# Patient Record
(demographics unavailable — no encounter records)

---

## 2024-10-15 NOTE — CONSULT LETTER
[Dear  ___] : Dear  [unfilled], [Consult Letter:] : I had the pleasure of evaluating your patient, [unfilled]. [Please see my note below.] : Please see my note below. [Consult Closing:] : Thank you very much for allowing me to participate in the care of this patient.  If you have any questions, please do not hesitate to contact me. [Sincerely,] : Sincerely, [FreeTextEntry2] : Dr. Janel Lomeli [FreeTextEntry3] : Mónica Fowler DO  Director of Pulmonary Hypertension Department of Pulmonary and Critical Care [facility]

## 2024-10-15 NOTE — PROCEDURE
[FreeTextEntry1] : 9/9/24 RHC Baseline /87/113, SpO2 89% on room air, SVC 57%, RA 65% PA 62% RA 10 RV 65/12 PA 69/34/41 PAWP 13 TP 28, DPG 21, PVR 9.3 LONG CO/CI (td) 3.0/1/9, SV 51, Svi 21 SVR 2746 dynes  PVR/SVR 0.27 Brennon 3.4, RVSWI 13.5  Val HR 51 /84/114, SpO2 100% PA 60/27/34 PAWP 15 CO/CI 73/1.75, SV 53, Svi 34 TPG 19, DPG 12  PVR 6.9  Severe precapillary disease, while she is at risk for WHO Group II and III PH, her hemodynamics are most consistent with  group I phenotype. Discussed with patient and  at bedside post procedure re: need for vasodilator therapy. Will plan for tadalafil + Opsumit for ease of polypharmacy, likely would benefit from full triple therapy + sotatercept, may be limited due to medication side effects, adherence and comorbidities. To follow up in clinic tomorrow to discuss further.  ***** 8/27/24 Pro   Na 146 (H) A1c 5.5% HIV, Hepatitis B and C non reactive Centromere Ab 2.7 (H) ESR, Rheumatoid factor, TSH, Scleroderma, ds DNA, Sjogren's SHERLY  WNL ***** 7/30/24 CT CHEST 1. Nonspecific bilateral pulmonary nodules measuring up to 1.3 cm in the right lower lobe. Recommend follow-up chest CT in 3 months to determine stability.  2. Severely dilated pulmonary artery representing pulmonary hypertension. Mosaic attenuation of the lungs likely secondary to pulmonary hypertension. **** 7/22/24 PFT FVC 1.65 (63) -> 1.85 (71) FEV1 0.77 (38) -> 0.78 (39) FEV1/FVC 59 -> 53 TLC 3.07 (62) DLCO 7.58 (41) There is moderate obstruction w/o sig BD response. Moderate restriction. No Air trapping. DLCO severely reduced *** 7/22/24 6MWT 183 meters Resting SpO2 92% on room air. Desaturation to 88% requiring supplemental O2 at 5L to maintain SpO2 > 88%. Mild Dyspnea (Mir 1) and fatigue (Mir 2.5). No rests taken. **** 7/3/24 ECHO 1. Left ventricular cavity is normal in size. Left ventricular systolic function is normal with an ejection fraction of 69 % by Jacobo's method of disks.  2. There is mild (grade 1) left ventricular diastolic dysfunction.  3. Mild left ventricular hypertrophy.  4. Normal right ventricular cavity size and normal right ventricular systolic function.  5. The left atrium is mildly dilated.  6. The right atrium is mildly dilated.  7. Aortic root at the sinuses of Valsalva is dilated, measuring 4.20 cm (indexed 2.74 cm/m). Ascending aorta diameter is dilated, measuring 4.50 cm (indexed 2.93 cm/m).  8. Mild aortic regurgitation.  9. Mild to moderate pulmonic regurgitation.  10. Dilated pulmonary artery. Main PA measures 4.0 cm. 1 1. Mild mitral regurgitation.  12. Mild tricuspid regurgitation.  13. Estimated pulmonary artery systolic pressure is 66 mmHg, consistent with severe pulmonary hypertension.  14. No pericardial effusion seen. 15. Compared to the transthoracic echocardiogram performed on 8/7/2018, ascending aorta is dilated, severe PHTN is present.

## 2024-10-15 NOTE — PHYSICAL EXAM
[No Acute Distress] : no acute distress [Normal Oropharynx] : normal oropharynx [Normal Appearance] : normal appearance [No Neck Mass] : no neck mass [Normal Rate/Rhythm] : normal rate/rhythm [Normal S1, S2] : normal s1, s2 [No Murmurs] : no murmurs [No Resp Distress] : no resp distress [Clear to Auscultation Bilaterally] : clear to auscultation bilaterally [No Abnormalities] : no abnormalities [Benign] : benign [Normal Gait] : normal gait [No Cyanosis] : no cyanosis [No Edema] : no edema [FROM] : FROM [Normal Color/ Pigmentation] : normal color/ pigmentation [No Focal Deficits] : no focal deficits [Oriented x3] : oriented x3 [Normal Affect] : normal affect [TextBox_99] : no articular manifestations of rheumatological disease [TextBox_105] : mild clubbing

## 2024-10-15 NOTE — REASON FOR VISIT
[Home] : at home, [unfilled] , at the time of the visit. [Medical Office: ([facility])___] : at the medical office located in  [Spouse] : spouse [Verbal consent obtained from patient] : the patient, [unfilled] [Follow-Up] : a follow-up visit [Pulmonary Hypertension] : pulmonary hypertension [Shortness of Breath] : shortness of breath [TextBox_13] : Dr. Janel Lomeli

## 2024-10-15 NOTE — REASON FOR VISIT
[Follow-Up] : a follow-up visit [Pulmonary Hypertension] : pulmonary hypertension [Shortness of Breath] : shortness of breath [TextBox_13] : Dr. Janel Lomeli

## 2024-10-15 NOTE — HISTORY OF PRESENT ILLNESS
[TextBox_4] : 73 year old F with asthma (Flovent Diskus) on cognitive disability and hearing loss here for evaluation pulmonary hypertension after seeing Dr. Janel Lomeli for cyanosis and shortness of breath. She is accompanied by her  who provides most of the history.  In 2022 she had COVID pneumonia and was hospitalized. She did not require invasive ventilation and was monitored for a few days. Since then she has had significant neurologic decline most notable for short term memory loss. She also has a longstanding history of asthma since adulthood. She does not recall how she was diagnosed or if she's ever had PFTs. She has been on Breo in the past and currently on wixela 250-50 once daily. In 4/2024 she was hospitalized at [facility] for pneumonia and discharged home on O2. Since discharge she has felt more fatigued, and with increased dyspnea on minimal exertion. She also notes that this was ongoing prior to her hospitalization but the pneumonia worsened it. Her  notes that her dyspnea is sporadic as sometimes she is able to go to [employer] and can walk for a mile without shortness of breath. She has a history of PDA requiring surgical closure and had CHF related to pregnancy many years ago. At her last visit with Dr. Loemli she noted she feels slow improvement in her breathing. She had an incident where she was at a family function and felt acutely dizzy/lightheaded. She increased her O2 from 2LPM to 4LPM with improvement. She is still having intermittent episodes of cyanotic hands.  On 2LPM O2 only with O2 saturations are low throughout the day, uses 4LPM with exertion. Essentially puts on O2 whenever cyanotic, but difficult to obtain SpO2. Uses albuterol and flovent diskus, and asthma is well controlled. Can walk 100 feet before feeling short of breath. Denies any fever, chills, cough, chest pain, weight loss, or leg swelling. Had bronchitis in December and PNA in March. Denies any Raynaud's phenomenon like symptoms.  PH Regimen tadalafil 40 mg daily [started October 2024] macitentan (Opsumit) 10 mg daily [started October 2024]  10-17-24 Started tadalafil 20 mg daily and then a few days later added Opsumit then several days later added the second 20 mg tablet of tadalafil. She had some episodes of dizziness after starting the full 40 mg tadalafil. She was instructed to decrease the tadalafil to 20 mg daily and to keep a log of her weights. Baseline weight 117 pounds, has increased to 121 pounds.  The circulation in her hands has improved since starting the medication

## 2024-10-15 NOTE — ASSESSMENT
[FreeTextEntry1] : 73 year old F with asthma (Flovent Diskus) here for evaluation pulmonary hypertension after seeing Dr. Janel Lomeli for cyanosis and shortness of breath. She is accompanied by her  who provides most of the history. Has signfiicant RIVAS and noted to have PH on TTE and severely enlarged PA.   At risk for multifactorial PH: WHO group I (congenital s/p PDA closure, needs to be assessed for CTD given abnormal parenchyma and mildly dilated esophagus on CT) vs WHO group II (diastolic dysfunction, enlarged LA) vs WHO Group III (combined restriction/obstructive pattern, CT chest with faint centrilobular GGO and mosaicism).  Non con CT showing possible mosaicism vs centrilobular GGO, dilated PA 5.4cm, and dilated esophagus with debris. 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   CT chest with contrast previously performed at OSH 4/2024. PFTs in July 2024 showed FEV1/FVC 59%, FEV1 39%, FVC 63%, TLC 62%, DLCO 41% (FVC/DCLO 1.5). 6MWT 183m. Combined obstruction and restriction w/ mod to severely reduced DLCO severely reduced walk distance.  REVEAL RISK: INT (if considered idiopathic) or HIGH (if found to have CTD)  RHC done 9/9/24 - severe precapillary disease with low CO/CI. Shunt run suggestive of bidirectional shunt, possible opening of PFO but this is likely protective due to severe PH. Her significant cognitive decline and difficulty with medications as well as overall goals of care make more aggressive treatment more difficult. Discussed extensively with patient, , and daughter - opt for dual oral therapy with tadalafil and opsumit (once daily dosing). Can consider escalation or alternative therapy if inadequate response or adverse medication side effects.   Plan: -Tadalafil + opsumit to start - Short interval reassessment, consider sotatercept and/or oral prostacyclin. Prostacyclin may be limited by side effects - Rheum evaluation to assess for scleroderma

## 2024-10-15 NOTE — HISTORY OF PRESENT ILLNESS
[TextBox_4] : 73 year old F with asthma (Flovent Diskus) on cognitive disability and hearing loss here for evaluation pulmonary hypertension after seeing Dr. Janel Lomeli for cyanosis and shortness of breath. She is accompanied by her  who provides most of the history.  In 2022 she had COVID pneumonia and was hospitalized. She did not require invasive ventilation and was monitored for a few days. Since then she has had significant neurologic decline most notable for short term memory loss. She also has a longstanding history of asthma since adulthood. She does not recall how she was diagnosed or if she's ever had PFTs. She has been on Breo in the past and currently on wixela 250-50 once daily. In 4/2024 she was hospitalized at [facility] for pneumonia and discharged home on O2. Since discharge she has felt more fatigued, and with increased dyspnea on minimal exertion. She also notes that this was ongoing prior to her hospitalization but the pneumonia worsened it. Her  notes that her dyspnea is sporadic as sometimes she is able to go to [employer] and can walk for a mile without shortness of breath. She has a history of PDA requiring surgical closure and had CHF related to pregnancy many years ago. At her last visit with Dr. Lomeli she noted she feels slow improvement in her breathing. She had an incident where she was at a family function and felt acutely dizzy/lightheaded. She increased her O2 from 2LPM to 4LPM with improvement. She is still having intermittent episodes of cyanotic hands.  On 2LPM O2 only with O2 saturations are low throughout the day, uses 4LPM with exertion. Essentially puts on O2 whenever cyanotic, but difficult to obtain SpO2. Uses albuterol and flovent diskus, and asthma is well controlled. Can walk 100 feet before feeling short of breath. Denies any fever, chills, cough, chest pain, weight loss, or leg swelling. Had bronchitis in December and PNA in March. Denies any Raynaud's phenomenon like symptoms.  PH Regimen tadalafil 40 mg daily [started October 2024] macitentan (Opsumit) 10 mg [started October 2024]  11-12-24   9-10-24 Here to review RHC results and start on vasodilators. Cath yesterday with severe precap PH and low CO/CI.

## 2024-10-16 NOTE — HISTORY OF PRESENT ILLNESS
[TextBox_4] : 73 year old F with asthma (Flovent Diskus) on cognitive disability and hearing loss here for evaluation pulmonary hypertension after seeing Dr. Janel Lomeli for cyanosis and shortness of breath. She is accompanied by her  who provides most of the history.  In 2022 she had COVID pneumonia and was hospitalized. She did not require invasive ventilation and was monitored for a few days. Since then she has had significant neurologic decline most notable for short term memory loss. She also has a longstanding history of asthma since adulthood. She does not recall how she was diagnosed or if she's ever had PFTs. She has been on Breo in the past and currently on wixela 250-50 once daily. In 4/2024 she was hospitalized at [facility] for pneumonia and discharged home on O2. Since discharge she has felt more fatigued, and with increased dyspnea on minimal exertion. She also notes that this was ongoing prior to her hospitalization but the pneumonia worsened it. Her  notes that her dyspnea is sporadic as sometimes she is able to go to [employer] and can walk for a mile without shortness of breath. She has a history of PDA requiring surgical closure and had CHF related to pregnancy many years ago. At her last visit with Dr. Lomeli she noted she feels slow improvement in her breathing. She had an incident where she was at a family function and felt acutely dizzy/lightheaded. She increased her O2 from 2LPM to 4LPM with improvement. She is still having intermittent episodes of cyanotic hands.  On 2LPM O2 only with O2 saturations are low throughout the day, uses 4LPM with exertion. Essentially puts on O2 whenever cyanotic, but difficult to obtain SpO2. Uses albuterol and flovent diskus, and asthma is well controlled. Can walk 100 feet before feeling short of breath. Denies any fever, chills, cough, chest pain, weight loss, or leg swelling. Had bronchitis in December and PNA in March. Denies any Raynaud's phenomenon like symptoms.  PH Regimen tadalafil 40 mg daily [started October 2024] macitentan (Opsumit) 10 mg daily [started October 2024]  10-16-24 Started tadalafil 20 mg daily and then a few days later added Opsumit then several days later added the second 20 mg tablet of tadalafil. She had some episodes of dizziness after starting the full 40 mg tadalafil. She was instructed to decrease the tadalafil to 20 mg daily and to keep a log of her weights. Baseline weight 117 pounds, has increased to 122 pounds. There are some noticeable physical changes. For instance, Edita has always needed a spacer for her wedding ring to fit comfortably. However, the spacer has now been removed as the ring fits snugly without it. Additionally, there are signs of slight puffiness in her hands. Also, this past Monday Edita had a routine follow-up with Dr. Torres and he looked at Edita's knees and ankles as part of her check up.  He wasn't alarmed with any of his observations, but did suggest providing us with her current weight gain update.  The circulation in her hands has improved since starting the medication

## 2024-10-16 NOTE — ASSESSMENT
[FreeTextEntry1] : 73 year old F with asthma (Flovent Diskus) here for evaluation pulmonary hypertension after seeing Dr. Janel Lomeli for cyanosis and shortness of breath. She is accompanied by her  who provides most of the history. Has signfiicant RIVAS and noted to have PH on TTE and severely enlarged PA.   At risk for multifactorial PH: WHO group I (congenital s/p PDA closure, needs to be assessed for CTD given abnormal parenchyma and mildly dilated esophagus on CT) vs WHO group II (diastolic dysfunction, enlarged LA) vs WHO Group III (combined restriction/obstructive pattern, CT chest with faint centrilobular GGO and mosaicism).  Non con CT showing possible mosaicism vs centrilobular GGO, dilated PA 5.4cm, and dilated esophagus with debris. 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  CT chest with contrast previously performed at OSH 4/2024. PFTs in July 2024 showed FEV1/FVC 59%, FEV1 39%, FVC 63%, TLC 62%, DLCO 41% (FVC/DCLO 1.5). 6MWT 183m. Combined obstruction and restriction w/ mod to severely reduced DLCO severely reduced walk distance.  REVEAL RISK: INT (if considered idiopathic)   RHC done 9/9/24 - severe precapillary disease with low CO/CI. Shunt run suggestive of bidirectional shunt, possible opening of PFO but this is likely protective due to severe PH. Her significant cognitive decline and difficulty with medications as well as overall goals of care make more aggressive treatment more difficult. Dual oral therapy with tadalafil and Opsumit (once daily dosing) started last week. Since beginning therapy she has had some dizziness and a 5 pound dry weight gain.  Plan: - Tadalafil 20 mg daily (decreased from 40 mg after c/o dizziness) - Opsumit 10 mg daily - BMP and NTpro BNP today and start furosemide 20 mg daily to offset fluid retention from ERA and PDE5i - Short interval reassessment, consider sotatercept and/or oral prostacyclin. Prostacyclin may be limited by side effects  RTC in [place] 11/12/24

## 2024-10-24 NOTE — HISTORY OF PRESENT ILLNESS
[TextBox_4] : 73 year old F with asthma (Flovent Diskus) on cognitive disability and hearing loss here for evaluation pulmonary hypertension after seeing Dr. Janel Lomeli for cyanosis and shortness of breath. She is accompanied by her  who provides most of the history.  In 2022 she had COVID pneumonia and was hospitalized. She did not require invasive ventilation and was monitored for a few days. Since then she has had significant neurologic decline most notable for short term memory loss. She also has a longstanding history of asthma since adulthood. She does not recall how she was diagnosed or if she's ever had PFTs. She has been on Breo in the past and currently on wixela 250-50 once daily. In 4/2024 she was hospitalized at [facility] for pneumonia and discharged home on O2. Since discharge she has felt more fatigued, and with increased dyspnea on minimal exertion. She also notes that this was ongoing prior to her hospitalization but the pneumonia worsened it. Her  notes that her dyspnea is sporadic as sometimes she is able to go to [employer] and can walk for a mile without shortness of breath. She has a history of PDA requiring surgical closure and had CHF related to pregnancy many years ago. At her last visit with Dr. Lomeli she noted she feels slow improvement in her breathing. She had an incident where she was at a family function and felt acutely dizzy/lightheaded. She increased her O2 from 2LPM to 4LPM with improvement. She is still having intermittent episodes of cyanotic hands.  On 2LPM O2 only with O2 saturations are low throughout the day, uses 4LPM with exertion. Essentially puts on O2 whenever cyanotic, but difficult to obtain SpO2. Uses albuterol and flovent diskus, and asthma is well controlled. Can walk 100 feet before feeling short of breath. Denies any fever, chills, cough, chest pain, weight loss, or leg swelling. Had bronchitis in December and PNA in March. Denies any Raynaud's phenomenon like symptoms.  PH Regimen tadalafil 40 mg daily [started October 2024] macitentan (Opsumit) 10 mg daily [started October 2024]  10-24-24 She was feeling a little dizzy this morning. She was doing well the past few days. Got up too fast this morning and felt lightheaded and dizzy.  forgot to take her BP while she was not feeling. She decreased the dose of tadalafil to 20 mg for 3 days then increased back to 40 mg. "purple hands" incidents are much less. She always feels cold but that is not new. Her SpO2 has been 96% on room air, goes as low as 91%. Now that she is on the medication it is much easier to get the SpO2 readings. Uses O2 at 2-3L/min at rest if she feels like she needs it. When they are out of the home and she feels out of breath they give her 3-4L. Her POC is very heavy.  She has been taking furosemide 20 mg daily. Weight has been 121 - 122.7.Her hands are not puffy any longer. Her rings are fitting better.   has seen her walking more since starting the medication but when she is tired she is VERY fatigued.

## 2024-10-24 NOTE — REASON FOR VISIT
[Home] : at home, [unfilled] , at the time of the visit. [Medical Office: ([facility])___] : at the medical office located in  [Spouse] : spouse [Patient] : the patient [Self] : self [Follow-Up] : a follow-up visit [Pulmonary Hypertension] : pulmonary hypertension [Shortness of Breath] : shortness of breath [TextBox_13] : Dr. Janle Lomeli

## 2024-10-24 NOTE — ASSESSMENT
[FreeTextEntry1] : 73 year old F with asthma (Flovent Diskus) here for evaluation pulmonary hypertension after seeing Dr. Janel Lomeli for cyanosis and shortness of breath. She is accompanied by her  who provides most of the history. Has signfiicant RIVAS and noted to have PH on TTE and severely enlarged PA.   At risk for multifactorial PH: WHO group I (congenital s/p PDA closure, needs to be assessed for CTD given abnormal parenchyma and mildly dilated esophagus on CT) vs WHO group II (diastolic dysfunction, enlarged LA) vs WHO Group III (combined restriction/obstructive pattern, CT chest with faint centrilobular GGO and mosaicism).  Non con CT showing possible mosaicism vs centrilobular GGO, dilated PA 5.4cm, and dilated esophagus with debris. 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  CT chest with contrast previously performed at OSH 4/2024. PFTs in July 2024 showed FEV1/FVC 59%, FEV1 39%, FVC 63%, TLC 62%, DLCO 41% (FVC/DCLO 1.5). 6MWT 183m. Combined obstruction and restriction w/ mod to severely reduced DLCO severely reduced walk distance.  REVEAL RISK: INT   RHC done 9/9/24 - severe precapillary disease with low CO/CI. Shunt run suggestive of bidirectional shunt, possible opening of PFO but this is likely protective due to severe PH. Her significant cognitive decline and difficulty with medications as well as overall goals of care make more aggressive treatment more difficult. Dual oral therapy with tadalafil and Opsumit (once daily dosing) started about 3 weeks ago. Since beginning therapy she has had some dizziness and a 5 pound dry weight gain. Her peripheral edema has improved on furosemide and weight gain has stabilized. Her dizziness returned when the tadalafil was increased back to 40 mg.   Plan: - Tadalafil 20 mg daily (reinforced to keep dose at 20 mg until she sees us in November) - Opsumit 10 mg daily - Continue furosemide 20 mg daily to offset fluid retention from ERA and PDE5i - Monitor BP at home, especially when feeling dizzy. Keep a log of BPs - Use oxygen with activity. Pt is mobile inside and outside of the home and benefits from portable oxygen concentrator although her present POC is very heavy - Short interval reassessment, consider sotatercept and/or oral prostacyclin. Prostacyclin may be limited by side effects  RTC in [place] 11/12/24
I have personally seen and examined this patient.  I have fully participated in the care of this patient. I have reviewed all pertinent clinical information, including history, physical exam, plan and the Resident’s note and agree except as noted.

## 2024-11-12 NOTE — CONSULT LETTER
[FreeTextEntry2] : Dr. Janel Lomeli [FreeTextEntry3] : Mónica Fowler DO  Director of Pulmonary Hypertension Department of Pulmonary and Critical Care [facility]

## 2024-11-12 NOTE — HISTORY OF PRESENT ILLNESS
[TextBox_4] : 73 year old F with asthma (Flovent Diskus) on cognitive disability and hearing loss here for evaluation pulmonary hypertension after seeing Dr. Janel Lomeli for cyanosis and shortness of breath. She is accompanied by her  who provides most of the history. In 2022 she had COVID pneumonia and was hospitalized. She did not require invasive ventilation and was monitored for a few days. Since then she has had significant neurologic decline most notable for short term memory loss. She also has a longstanding history of asthma since adulthood. She does not recall how she was diagnosed or if she's ever had PFTs, on flovent. In 4/2024 she was hospitalized at [facility] for pneumonia and discharged home on O2. Since discharge she has felt more fatigued, and with increased dyspnea on minimal exertion. She also notes that this was ongoing prior to her hospitalization but the pneumonia worsened it. She has a history of PDA requiring surgical closure and had CHF related to pregnancy many years ago. At our initial visit she had cyanosis of hands making SpO2 difficult, noted SOB w/ 100 ft. Underwent RHC which demonstrated    PH Regimen tadalafil 40 mg daily [started October 2024] macitentan (Opsumit) 10 mg [started October 2024]  11-12-24  notes a change - the good days are "great", less SOB. Bad days seem heavier than even before. On the good days she can definitely do more, but the times when she is SOB it is more severe. Rescue inhaler immediately helps symptoms. Using it once a day. If they know they're going to go for a walk she takes the rescue inhaler first and she tolerates walk better. They have noticed some wheezing and audible breathing more often. The good days are more often than the bad days since starting PH therapy.  Having tooth aches, had emergency visit to dentist this past weekend. Needs two teeth extracted. Dentist was concerned about any ?contraindications? from opsumit

## 2024-11-12 NOTE — CONSULT LETTER
[FreeTextEntry2] : Dr. Janel Lomeli [FreeTextEntry3] : Mónica Fowler DO  Director of Pulmonary Hypertension Department of Pulmonary and Critical Care [facility]

## 2024-11-12 NOTE — ASSESSMENT
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 At risk for multifactorial PH: WHO group I (congenital s/p PDA closure) vs WHO group II (diastolic dysfunction, enlarged LA) vs WHO Group III (combined restriction/obstructive pattern, CT chest with faint centrilobular GGO and mosaicism, dilated PA 5.4cm, and dilated esophagus with debris). She does have anticentromere ab but rheum felt this was a false positive given lack of additional signs/sx  CT chest with contrast previously performed at OSH 4/2024. PFTs in July 2024 showed FEV1/FVC 59%, FEV1 39%, FVC 63%, TLC 62%, DLCO 41% (FVC/DCLO 1.5). 6MWT 183m. Combined obstruction and restriction w/ mod to severely reduced DLCO severely reduced walk distance. RHC done 9/9/24 - severe precapillary disease with low CO/CI. Shunt run suggestive of bidirectional shunt, possible opening of PFO but this is likely protective due to severe PH.   REVEAL RISK: INT (if considered idiopathic)   Her significant cognitive decline and difficulty with medications as well as overall goals of care make more aggressive treatment more difficult. Discussed extensively with patient, , and daughter - opt for dual oral therapy with tadalafil and opsumit (once daily dosing). Can consider escalation or alternative therapy if inadequate response or adverse medication side effects. Now on dual therapy x 1 month, seems overall to have some improvement but possible that vasodilators have exacerbated airway disease/occasional V/Q mismatching. Severe SOB episodes are transient and improve w/ rescue inhaler. Mild fluid retention on opsumit with inc weight of 5lbs.  Plan: -Tadalafil + opsumit, increase lasix to 40mg every other day until weight down to 117lbs - Step up inhaler therapy to advair daily, continue PRN albuterol, monitor need for rescue inhaler - Short interval reassessment at 3 months w/ PFT, 6MWT, NTproBNP to reassess risk asssement - Consider sotatercept and/or oral prostacyclin. Prostacyclin may be limited by side effects  F/U 2 months after above

## 2024-11-12 NOTE — HISTORY OF PRESENT ILLNESS
[TextBox_4] : 73 year old F with asthma (Flovent Diskus) on cognitive disability and hearing loss here for evaluation pulmonary hypertension after seeing Dr. Janel Lomeli for cyanosis and shortness of breath. She is accompanied by her  who provides most of the history. In 2022 she had COVID pneumonia and was hospitalized. She did not require invasive ventilation and was monitored for a few days. Since then she has had significant neurologic decline most notable for short term memory loss. She also has a longstanding history of asthma since adulthood. She does not recall how she was diagnosed or if she's ever had PFTs, on flovent. In 4/2024 she was hospitalized at [facility] for pneumonia and discharged home on O2. Since discharge she has felt more fatigued, and with increased dyspnea on minimal exertion. She also notes that this was ongoing prior to her hospitalization but the pneumonia worsened it. She has a history of PDA requiring surgical closure and had CHF related to pregnancy many years ago. At our initial visit she had cyanosis of hands making SpO2 difficult, noted SOB w/ 100 ft. Underwent RHC which demonstrated    PH Regimen tadalafil 40 mg daily [started October 2024] macitentan (Opsumit) 10 mg [started October 2024]  11-12-24  notes a change - the good days are "great", less SOB. Bad days seem heavier than even before. On the good days she can definitely do more, but the times when she is SOB it is more severe. Rescue inhaler immediately helps symptoms. Using it once a day. If they know they're going to go for a walk she takes the rescue inhaler first and she tolerates walk better. They have noticed some wheezing and audible breathing more often. The good days are more often than the bad days since starting PH therapy.  Having tooth aches, had emergency visit to dentist this past weekend. Needs two teeth extracted. Dentist was concerned about any ?contraindications? from opsumit

## 2024-12-10 NOTE — ASSESSMENT
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 At risk for multifactorial PH: WHO group I (congenital s/p PDA closure) vs WHO group II (diastolic dysfunction, enlarged LA) vs WHO Group III (combined restriction/obstructive pattern, CT chest with faint centrilobular GGO and mosaicism, dilated PA 5.4cm, and dilated esophagus with debris). She does have anticentromere ab but rheum felt this was a false positive given lack of additional signs/sx  CT chest with contrast previously performed at OSH 4/2024. PFTs in July 2024 showed FEV1/FVC 59%, FEV1 39%, FVC 63%, TLC 62%, DLCO 41% (FVC/DCLO 1.5). 6MWT 183m. Combined obstruction and restriction w/ mod to severely reduced DLCO severely reduced walk distance. RHC done 9/9/24 - severe precapillary disease with low CO/CI. Shunt run suggestive of bidirectional shunt, possible opening of PFO but this is likely protective due to severe PH.   REVEAL RISK: INT (if considered idiopathic)   Started on dual oral therapy due to concern of more complicated/titrated medications with her significant cognitive decline and difficulty with medications. Initially noted definite improvement although some episodic sudden RIVAS for which may have been more related to airway disease and stepped up advair therapy. Now her breathing symptoms are improved but she is having frequent dizzy spells. Etiology remains unclear. Need to r/o worsening PH (although recent NTproBNP WNL), r/o PE, d/w cardiology re: biopatch to r/o arrhythmias, and F/U w/ PCP to r/o non cardiopulmonary causes (carotid US? TSH?).   Plan: - Repeat NTproBNP, obtain CTA PE protocol, repeat TTE, and d/w cardiology re: HR monitor device to r/o arrhythma - Advised to go to ER if dizziness worsens or persists -Tadalafil + opsumit, increase lasix to 40mg every other day until weight down to 117lbs - Continue advair daily, continue PRN albuterol, monitor need for rescue inhaler - Consider sotatercept and/or oral prostacyclin. Prostacyclin may be limited by side effects  F/U 1 month

## 2024-12-10 NOTE — HISTORY OF PRESENT ILLNESS
[TextBox_4] : 73 year old F with asthma (Flovent Diskus) on cognitive disability and hearing loss here for evaluation pulmonary hypertension after seeing Dr. Janel Lomeli for cyanosis and shortness of breath. She is accompanied by her  who provides most of the history. In 2022 she had COVID pneumonia and was hospitalized. She did not require invasive ventilation and was monitored for a few days. Since then she has had significant neurologic decline most notable for short term memory loss. She also has a longstanding history of asthma since adulthood. She does not recall how she was diagnosed or if she's ever had PFTs, on flovent. In 4/2024 she was hospitalized at [facility] for pneumonia and discharged home on O2. Since discharge she has felt more fatigued, and with increased dyspnea on minimal exertion. She also notes that this was ongoing prior to her hospitalization but the pneumonia worsened it. She has a history of PDA requiring surgical closure and had CHF related to pregnancy many years ago. At our initial visit she had cyanosis of hands making SpO2 difficult, noted SOB w/ 100 ft. Underwent RHC which demonstrated severe precap PH (mPA 41, PAWP 13, CO/CI 3/1.9, PVR 9). Given severity of PVR it was felt this was vascular phenotype despite group II and III disease, she was placed on dual therapy w/ macitentan/tadalafil   PH Regimen tadalafil 20 mg daily [started October 2024] macitentan (Opsumit) 10 mg [started October 2024]  12-10-24 Email from : I wanted to provide an update on Edita. Over the past few weeks, she has been experiencing sudden frequent episodes of dizziness and being disoriented. These episodes seem relatively consistent and are often triggered by: 	-	Walking more than 50 feet. 	-	Morning and sometimes afternoon activities, especially during or right after using the restroom. While not entirely certain, taking oxygen seems to help her recover more quickly. Although it seems that she is not having unusual breathing difficulty.  Additionally, she appears noticeably more tired and exhausted overall. Do you have any suggestions on how we should proceed?  Thank you for your guidance, Yessenia   12/10/24 - Telephone visit to discuss above email. Difficult to decipher symptoms as pt is poor historian due to memory loss, difficulty answering questions, relies on her  for much of history which limits understanding of these brief dizziness spells. They come on suddenly and resolve quickly. He will usually put her on oxygen though he has not documented hypoxia with pulse ox. Cannot say whether he puts her on O2 first or checks her pulse ox first. He does not feel her SOB is worse, but the dizziness is more frequent.

## 2024-12-10 NOTE — REASON FOR VISIT
[Home] : at home, [unfilled] , at the time of the visit. [Medical Office: ([facility])___] : at the medical office located in  [Spouse] : spouse [Verbal consent obtained from patient] : the patient, [unfilled] [Follow-Up] : a follow-up visit [Pulmonary Hypertension] : pulmonary hypertension [Shortness of Breath] : shortness of breath [FreeTextEntry4] : Yessenia Nicolas [TextBox_13] : Dr. Janel Lomeli

## 2024-12-23 NOTE — PROCEDURE
[FreeTextEntry1] : 12/18/24 ECHO at [place] 1. Left ventricular cavity is normal in size. Left ventricular systolic function is normal with an ejection fraction of 77 % by Jacobo's method of disks. 2. There is mild (grade 1) left ventricular diastolic dysfunction. 3. There is increased LV mass and concentric hypertrophy. 4. Normal right ventricular cavity size and normal right ventricular systolic function. 5. Left atrium is severely dilated. 6. The right atrium is mildly dilated. 7. Aortic root at the sinuses of Valsalva is dilated, measuring 4.20 cm (indexed 2.70 cm/m). Ascending aorta is dilated, measuring 4.40 cm (indexed 2.83 cm/m). 8. The main pulmonary artery is dilated at 4.6 cm. There appears to be a PDA. 9. Mild aortic regurgitation. 10. Moderate pulmonic regurgitation. 11. Mild mitral regurgitation. 12. Pulmonary artery systolic pressure could not be estimated. 13. No pericardial effusion seen. 14. Compared to the transthoracic echocardiogram performed on 7/3/2024, main PA is more dilated, there appears to be a PDA. ***** 12/18/24 CTA CHEST at [place] 1. No pulmonary embolism. Dilated main pulmonary artery suggestive of pulmonary hypertension.    2. Scattered areas of small area and large airway disease. Numerous lung nodules, probably  infectious/inflammatory in nature. 13 mm nodule at the basilar right lower lobe. Recommend follow-up CT chest in 6-12 months to ensure stability.    3. Aneurysmal thoracic aorta measuring up to 4.4 cm at the ascending aorta. Continued surveillance  is recommended.    4. Partial opacification of the hepatic veins suggestive of right heart failure.    5. Outpouching at the left side of the upper esophagus, probably a [place] diverticulum.     ***** 9/9/24 RHC Baseline /87/113, SpO2 89% on room air, SVC 57%, RA 65% PA 62% RA 10 RV 65/12 PA 69/34/41 PAWP 13 TP 28, DPG 21, PVR 9.3 LONG CO/CI (td) 3.0/1/9, SV 51, Svi 21 SVR 2746 dynes  PVR/SVR 0.27 Brennon 3.4, RVSWI 13.5  Val HR 51 /84/114, SpO2 100% PA 60/27/34 PAWP 15 CO/CI 73/1.75, SV 53, Svi 34 TPG 19, DPG 12  PVR 6.9  Severe precapillary disease, while she is at risk for WHO Group II and III PH, her hemodynamics are most consistent with  group I phenotype. Discussed with patient and  at bedside post procedure re: need for vasodilator therapy. Will plan for tadalafil + Opsumit for ease of polypharmacy, likely would benefit from full triple therapy + sotatercept, may be limited due to medication side effects, adherence and comorbidities. To follow up in clinic tomorrow to discuss further.  ***** 8/27/24 Pro   Na 146 (H) A1c 5.5% HIV, Hepatitis B and C non reactive Centromere Ab 2.7 (H) ESR, Rheumatoid factor, TSH, Scleroderma, ds DNA, Sjogren's SHERLY  WNL ***** 7/30/24 CT CHEST 1. Nonspecific bilateral pulmonary nodules measuring up to 1.3 cm in the right lower lobe. Recommend follow-up chest CT in 3 months to determine stability.  2. Severely dilated pulmonary artery representing pulmonary hypertension. Mosaic attenuation of the lungs likely secondary to pulmonary hypertension. **** 7/22/24 PFT FVC 1.65 (63) -> 1.85 (71) FEV1 0.77 (38) -> 0.78 (39) FEV1/FVC 59 -> 53 TLC 3.07 (62) DLCO 7.58 (41) There is moderate obstruction w/o sig BD response. Moderate restriction. No Air trapping. DLCO severely reduced *** 7/22/24 6MWT 183 meters Resting SpO2 92% on room air. Desaturation to 88% requiring supplemental O2 at 5L to maintain SpO2 > 88%. Mild Dyspnea (Mir 1) and fatigue (Mir 2.5). No rests taken. **** 7/3/24 ECHO 1. Left ventricular cavity is normal in size. Left ventricular systolic function is normal with an ejection fraction of 69 % by Jacobo's method of disks.  2. There is mild (grade 1) left ventricular diastolic dysfunction.  3. Mild left ventricular hypertrophy.  4. Normal right ventricular cavity size and normal right ventricular systolic function.  5. The left atrium is mildly dilated.  6. The right atrium is mildly dilated.  7. Aortic root at the sinuses of Valsalva is dilated, measuring 4.20 cm (indexed 2.74 cm/m). Ascending aorta diameter is dilated, measuring 4.50 cm (indexed 2.93 cm/m).  8. Mild aortic regurgitation.  9. Mild to moderate pulmonic regurgitation.  10. Dilated pulmonary artery. Main PA measures 4.0 cm. 1 1. Mild mitral regurgitation.  12. Mild tricuspid regurgitation.  13. Estimated pulmonary artery systolic pressure is 66 mmHg, consistent with severe pulmonary hypertension.  14. No pericardial effusion seen. 15. Compared to the transthoracic echocardiogram performed on 8/7/2018, ascending aorta is dilated, severe PHTN is present.

## 2024-12-23 NOTE — ASSESSMENT
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 At risk for multifactorial PH: WHO group I (congenital s/p PDA closure) vs WHO group II (diastolic dysfunction, enlarged LA) vs WHO Group III (combined restriction/obstructive pattern, CT chest with faint centrilobular GGO and mosaicism, dilated PA 5.4cm, and dilated esophagus with debris). 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 PFTs with obstruction and restriction + severely reduced DLCO  (FEV1/FVC 59%, FEV1 39%, FVC 63%, TLC 62%, DLCO 41%, FVC/DCLO 1.5). 6MWT 183m severely reduced walk distance. RHC done 9/9/24 - severe precapillary disease with low CO/CI. Shunt run suggestive of bidirectional shunt, possible opening of PFO but this is likely protective due to severe PH (SVC to PA increase of 5%, Ao sat 89%)  REVEAL RISK: INT (if considered idiopathic)   Started on dual oral therapy due to concern of more complicated/titrated medications with her significant cognitive decline and difficulty with medications. Initially noted definite improvement although some episodic sudden RIVAS for which may have been more related to airway disease and stepped up advair therapy. Now her breathing symptoms are improved but she is having frequent dizzy spells. Holter monitor was placed on 12/16/24, she has had multiple episodes since then and now 2 ER visits. Given worsening dizziness without worsening RV size/function and only mild increase in NTproBNP, decision made to temporarily stop tadalafil. Her mutlifactorial PH with group I, II, and III RF may make her intolerant to dual oral therapy. PDE5i more likely to cause dizziness than ERA.    Ultimately may need further work up for PDA including cMRI and/or repeat RHC. Given she feels improved off tadalafil, we will continue with monotherapy until follow up visit in 3 weeks. Will also reach out to cardiology to discuss evaluating monitor early to r/o arrhythmias during these multiple events at home. Lastly, would need to consider LM coronary artery compression by dilated PA but this seems less likely. May need to consider chest pain was more related to esophageal disease and constipation noted on CT. Will also obtain home sleep study to r/o DEION.  If she was recurrence of her chest pain and/or progression of her dizziness, we have advised her  to bring to ED and plan for transfer to [facility] (or come straight to [facility] if feasible) for admission to further assess for LM compression, PDA shunting, and progression of PAH with RHC.   Plan: - HOLD tadalafil until next follow up - F/U holter monitor readings to r/o arrhythmia, sent message to Dr. Dionisio Lomeli - Home sleep study - Reassess in 3 weeks and will plan for either cMRI and/or repeat RHC to reassess shunt and PH, may need referral to Dr. García (congenital) - If she again has chest pain and/or worsening dizziness we have asked that she be transferred to [facility] for more in-depth work up as outlined above.  F/U 3 weeks

## 2024-12-23 NOTE — REASON FOR VISIT
[Home] : at home, [unfilled] , at the time of the visit. [Medical Office: ([facility])___] : at the medical office located in  [Spouse] : spouse [Verbal consent obtained from patient] : the patient, [unfilled] [Follow-Up] : a follow-up visit [Pulmonary Hypertension] : pulmonary hypertension [Shortness of Breath] : shortness of breath [FreeTextEntry4] : Yessenia Nicolas [TextBox_13] : Dr. Janel Lomeli

## 2024-12-23 NOTE — HISTORY OF PRESENT ILLNESS
[TextBox_4] : 73 year old F with asthma (Flovent Diskus) on cognitive disability and hearing loss here for evaluation pulmonary hypertension after seeing Dr. Janel Lomeli for cyanosis and shortness of breath. She is accompanied by her  who provides most of the history. In 2022 she had COVID pneumonia and was hospitalized. She did not require invasive ventilation and was monitored for a few days. Since then she has had significant neurologic decline most notable for short term memory loss. She also has a longstanding history of asthma since adulthood. She does not recall how she was diagnosed or if she's ever had PFTs, on flovent. In 4/2024 she was hospitalized at [facility] for pneumonia and discharged home on O2. Since discharge she has felt more fatigued, and with increased dyspnea on minimal exertion. She also notes that this was ongoing prior to her hospitalization but the pneumonia worsened it. She has a history of PDA requiring surgical closure and had CHF related to pregnancy many years ago. At our initial visit she had cyanosis of hands making SpO2 difficult, noted SOB w/ 100 ft. Underwent RHC which demonstrated severe precap PH (mPA 41, PAWP 13, CO/CI 3/1.9, PVR 9). Given severity of PVR it was felt this was vascular phenotype despite group II and III disease, she was placed on dual therapy w/ macitentan/tadalafil   PH Regimen tadalafil 20 mg daily [started October 2024, HOLDING Dec 18 2024] macitentan (Opsumit) 10 mg [started October 2024]  12-23-24 Patient hospitalized at [facility] last week for sustained dizziness and numbness in her feet. CTA with large airway disease and pulmonary hypertension, no PE, Dilated thoracic aortic aneurysm which will need vascular surveillance. Spoke to pulmonology again. Dr. Fowler recommended patient stop tadalafil for the next 3 days to assess worsening/improvement of symptoms. Patient returned to [facility] ER 12/22 with chest pain and had another CTA which did not reveal a PE. Of note, Zenker's diverticulum noted increased in size with food particles.  Spoke with  and patient via telephone to discuss 2 ER visits over the last week, first for dizziness that was persistent. Pt had CTA and PE ruled out, TTE showed normal RV size/function, unable to measure PASP, severe LAE, diastolic dysfunction, and PDA likely incomplete closure. Her ECG did not demonstrate ischemic changes and her trops were negative, we discussed discontinuing tadalafil as it may be contributing to her dizziness and I would call them today. In the interim, she went back to the ER yesterday for sudden chest pain, grabbed her chest "like a heart attack" per the , the pain did not subside right away and they returned to ED. In ED she was still having pain, but repeat CTA, ECG, and cardiac enzymes without acute cardiopulmonary disease. Known dilated PA, enlarged ZD of esophagus noted.    notes that her breathing is definitely better and her dizziness has improved since holding the tadalafil, He also notes she has been significant constipated and complained of gas pains prior to leaving the ED which improved when she was able to use the restroom. She does not excessive daytime fatigue which feels worse from prior.

## 2024-12-23 NOTE — ASSESSMENT
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 At risk for multifactorial PH: WHO group I (congenital s/p PDA closure) vs WHO group II (diastolic dysfunction, enlarged LA) vs WHO Group III (combined restriction/obstructive pattern, CT chest with faint centrilobular GGO and mosaicism, dilated PA 5.4cm, and dilated esophagus with debris). 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 PFTs with obstruction and restriction + severely reduced DLCO  (FEV1/FVC 59%, FEV1 39%, FVC 63%, TLC 62%, DLCO 41%, FVC/DCLO 1.5). 6MWT 183m severely reduced walk distance. RHC done 9/9/24 - severe precapillary disease with low CO/CI. Shunt run suggestive of bidirectional shunt, possible opening of PFO but this is likely protective due to severe PH (SVC to PA increase of 5%, Ao sat 89%)  REVEAL RISK: INT (if considered idiopathic)   Started on dual oral therapy due to concern of more complicated/titrated medications with her significant cognitive decline and difficulty with medications. Initially noted definite improvement although some episodic sudden RIVAS for which may have been more related to airway disease and stepped up advair therapy. Now her breathing symptoms are improved but she is having frequent dizzy spells. Holter monitor was placed on 12/16/24, she has had multiple episodes since then and now 2 ER visits. Given worsening dizziness without worsening RV size/function and only mild increase in NTproBNP, decision made to temporarily stop tadalafil. Her mutlifactorial PH with group I, II, and III RF may make her intolerant to dual oral therapy. PDE5i more likely to cause dizziness than ERA.    Ultimately may need further work up for PDA including cMRI and/or repeat RHC. Given she feels improved off tadalafil, we will continue with monotherapy until follow up visit in 3 weeks. Will also reach out to cardiology to discuss evaluating monitor early to r/o arrhythmias during these multiple events at home. Lastly, would need to consider LM coronary artery compression by dilated PA but this seems less likely. May need to consider chest pain was more related to esophageal disease and constipation noted on CT. Will also obtain home sleep study to r/o DEION.  If she was recurrence of her chest pain and/or progression of her dizziness, we have advised her  to bring to ED and plan for transfer to [facility] (or come straight to [facility] if feasible) for admission to further assess for LM compression, PDA shunting, and progression of PAH with RHC.   Plan: - HOLD tadalafil until next follow up - F/U holter monitor readings to r/o arrhythmia, sent message to Dr. Dionisio Lomeli - Home sleep study - Reassess in 3 weeks and will plan for either cMRI and/or repeat RHC to reassess shunt and PH, may need referral to Dr. García (congenital) - If she again has chest pain and/or worsening dizziness we have asked that she be transferred to [facility] for more in-depth work up as outlined above.  F/U 3 weeks

## 2024-12-23 NOTE — HISTORY OF PRESENT ILLNESS
[TextBox_4] : 73 year old F with asthma (Flovent Diskus) on cognitive disability and hearing loss here for evaluation pulmonary hypertension after seeing Dr. Janel Lomeli for cyanosis and shortness of breath. She is accompanied by her  who provides most of the history. In 2022 she had COVID pneumonia and was hospitalized. She did not require invasive ventilation and was monitored for a few days. Since then she has had significant neurologic decline most notable for short term memory loss. She also has a longstanding history of asthma since adulthood. She does not recall how she was diagnosed or if she's ever had PFTs, on flovent. In 4/2024 she was hospitalized at [facility] for pneumonia and discharged home on O2. Since discharge she has felt more fatigued, and with increased dyspnea on minimal exertion. She also notes that this was ongoing prior to her hospitalization but the pneumonia worsened it. She has a history of PDA requiring surgical closure and had CHF related to pregnancy many years ago. At our initial visit she had cyanosis of hands making SpO2 difficult, noted SOB w/ 100 ft. Underwent RHC which demonstrated severe precap PH (mPA 41, PAWP 13, CO/CI 3/1.9, PVR 9). Given severity of PVR it was felt this was vascular phenotype despite group II and III disease, she was placed on dual therapy w/ macitentan/tadalafil   PH Regimen tadalafil 20 mg daily [started October 2024, HOLDING Dec 18 2024] macitentan (Opsumit) 10 mg [started October 2024]  12-23-24 Patient hospitalized at [place] last week for sustained dizziness and numbness in her feet. CTA with large airway disease and pulmonary hypertension, no PE, Dilated thoracic aortic aneurysm which will need vascular surveillance. Spoke to pulmonology again. Dr. Fowler recommended patient stop tadalafil for the next 3 days to assess worsening/improvement of symptoms. Patient returned to [place] ER 12/22 with chest pain and had another CTA which did not reveal a PE. Of note, Zenker's diverticulum noted increased in size with food particles.  Spoke with  and patient via telephone to discuss 2 ER visits over the last week, first for dizziness that was persistent. Pt had CTA and PE ruled out, TTE showed normal RV size/function, unable to measure PASP, severe LAE, diastolic dysfunction, and PDA likely incomplete closure. Her ECG did not demonstrate ischemic changes and her trops were negative, we discussed discontinuing tadalafil as it may be contributing to her dizziness and I would call them today. In the interim, she went back to the ER yesterday for sudden chest pain, grabbed her chest "like a heart attack" per the , the pain did not subside right away and they returned to ED. In ED she was still having pain, but repeat CTA, ECG, and cardiac enzymes without acute cardiopulmonary disease. Known dilated PA, enlarged ZD of esophagus noted.    notes that her breathing is definitely better and her dizziness has improved since holding the tadalafil, He also notes she has been significant constipated and complained of gas pains prior to leaving the ED which improved when she was able to use the restroom. She does not excessive daytime fatigue which feels worse from prior.

## 2024-12-23 NOTE — PROCEDURE
[FreeTextEntry1] : 12/18/24 ECHO at [place] 1. Left ventricular cavity is normal in size. Left ventricular systolic function is normal with an ejection fraction of 77 % by Jacobo's method of disks. 2. There is mild (grade 1) left ventricular diastolic dysfunction. 3. There is increased LV mass and concentric hypertrophy. 4. Normal right ventricular cavity size and normal right ventricular systolic function. 5. Left atrium is severely dilated. 6. The right atrium is mildly dilated. 7. Aortic root at the sinuses of Valsalva is dilated, measuring 4.20 cm (indexed 2.70 cm/m). Ascending aorta is dilated, measuring 4.40 cm (indexed 2.83 cm/m). 8. The main pulmonary artery is dilated at 4.6 cm. There appears to be a PDA. 9. Mild aortic regurgitation. 10. Moderate pulmonic regurgitation. 11. Mild mitral regurgitation. 12. Pulmonary artery systolic pressure could not be estimated. 13. No pericardial effusion seen. 14. Compared to the transthoracic echocardiogram performed on 7/3/2024, main PA is more dilated, there appears to be a PDA. ***** 12/18/24 CTA CHEST at [place] 1. No pulmonary embolism. Dilated main pulmonary artery suggestive of pulmonary hypertension.    2. Scattered areas of small area and large airway disease. Numerous lung nodules, probably  infectious/inflammatory in nature. 13 mm nodule at the basilar right lower lobe. Recommend follow-up CT chest in 6-12 months to ensure stability.    3. Aneurysmal thoracic aorta measuring up to 4.4 cm at the ascending aorta. Continued surveillance  is recommended.    4. Partial opacification of the hepatic veins suggestive of right heart failure.    5. Outpouching at the left side of the upper esophagus, probably a [place] Nilam diverticulum.     ***** 9/9/24 RHC Baseline /87/113, SpO2 89% on room air, SVC 57%, RA 65% PA 62% RA 10 RV 65/12 PA 69/34/41 PAWP 13 TP 28, DPG 21, PVR 9.3 LONG CO/CI (td) 3.0/1/9, SV 51, Svi 21 SVR 2746 dynes  PVR/SVR 0.27 Brennon 3.4, RVSWI 13.5  Val HR 51 /84/114, SpO2 100% PA 60/27/34 PAWP 15 CO/CI 73/1.75, SV 53, Svi 34 TPG 19, DPG 12  PVR 6.9  Severe precapillary disease, while she is at risk for WHO Group II and III PH, her hemodynamics are most consistent with  group I phenotype. Discussed with patient and  at bedside post procedure re: need for vasodilator therapy. Will plan for tadalafil + Opsumit for ease of polypharmacy, likely would benefit from full triple therapy + sotatercept, may be limited due to medication side effects, adherence and comorbidities. To follow up in clinic tomorrow to discuss further.  ***** 8/27/24 Pro   Na 146 (H) A1c 5.5% HIV, Hepatitis B and C non reactive Centromere Ab 2.7 (H) ESR, Rheumatoid factor, TSH, Scleroderma, ds DNA, Sjogren's SHERLY  WNL ***** 7/30/24 CT CHEST 1. Nonspecific bilateral pulmonary nodules measuring up to 1.3 cm in the right lower lobe. Recommend follow-up chest CT in 3 months to determine stability.  2. Severely dilated pulmonary artery representing pulmonary hypertension. Mosaic attenuation of the lungs likely secondary to pulmonary hypertension. **** 7/22/24 PFT FVC 1.65 (63) -> 1.85 (71) FEV1 0.77 (38) -> 0.78 (39) FEV1/FVC 59 -> 53 TLC 3.07 (62) DLCO 7.58 (41) There is moderate obstruction w/o sig BD response. Moderate restriction. No Air trapping. DLCO severely reduced *** 7/22/24 6MWT 183 meters Resting SpO2 92% on room air. Desaturation to 88% requiring supplemental O2 at 5L to maintain SpO2 > 88%. Mild Dyspnea (Mir 1) and fatigue (Mir 2.5). No rests taken. **** 7/3/24 ECHO 1. Left ventricular cavity is normal in size. Left ventricular systolic function is normal with an ejection fraction of 69 % by Jacobo's method of disks.  2. There is mild (grade 1) left ventricular diastolic dysfunction.  3. Mild left ventricular hypertrophy.  4. Normal right ventricular cavity size and normal right ventricular systolic function.  5. The left atrium is mildly dilated.  6. The right atrium is mildly dilated.  7. Aortic root at the sinuses of Valsalva is dilated, measuring 4.20 cm (indexed 2.74 cm/m). Ascending aorta diameter is dilated, measuring 4.50 cm (indexed 2.93 cm/m).  8. Mild aortic regurgitation.  9. Mild to moderate pulmonic regurgitation.  10. Dilated pulmonary artery. Main PA measures 4.0 cm. 1 1. Mild mitral regurgitation.  12. Mild tricuspid regurgitation.  13. Estimated pulmonary artery systolic pressure is 66 mmHg, consistent with severe pulmonary hypertension.  14. No pericardial effusion seen. 15. Compared to the transthoracic echocardiogram performed on 8/7/2018, ascending aorta is dilated, severe PHTN is present.

## 2024-12-23 NOTE — REASON FOR VISIT
[Home] : at home, [unfilled] , at the time of the visit. [Medical Office: ([facility])___] : at the medical office located in  [Spouse] : spouse [Verbal consent obtained from patient] : the patient, [unfilled] [Follow-Up] : a follow-up visit [Pulmonary Hypertension] : pulmonary hypertension [Shortness of Breath] : shortness of breath [FreeTextEntry4] : Yessenia Nicolas [TextBox_13] : Dr. Janel Lomeli

## 2025-01-03 NOTE — HISTORY OF PRESENT ILLNESS
[TextBox_4] : 73 year old F with asthma (Flovent Diskus) on cognitive disability and hearing loss here for evaluation pulmonary hypertension after seeing Dr. Janel Lomeli for cyanosis and shortness of breath. She is accompanied by her  who provides most of the history. In 2022 she had COVID pneumonia and was hospitalized. She did not require invasive ventilation and was monitored for a few days. Since then she has had significant neurologic decline most notable for short term memory loss. She also has a longstanding history of asthma since adulthood. She does not recall how she was diagnosed or if she's ever had PFTs, on flovent. In 4/2024 she was hospitalized at [facility] for pneumonia and discharged home on O2. Since discharge she has felt more fatigued, and with increased dyspnea on minimal exertion. She also notes that this was ongoing prior to her hospitalization but the pneumonia worsened it. She has a history of PDA requiring surgical closure and had CHF related to pregnancy many years ago. At our initial visit she had cyanosis of hands making SpO2 difficult, noted SOB w/ 100 ft. Underwent RHC which demonstrated severe precap PH (mPA 41, PAWP 13, CO/CI 3/1.9, PVR 9). Given severity of PVR it was felt this was vascular phenotype despite group II and III disease, she was placed on dual therapy w/ macitentan/tadalafil   PH Regimen [tadalafil 20 mg daily started October 2024, HOLDING Dec 18 2024] macitentan (Opsumit) 10 mg [started October 2024]  1-14-25 Patient hospitalized again 12/30/24 - 12/31/24 for acute on chronic respiratory failure with hypoxia secondary asthma  exacerbation associated w/ acute RSV infection. Patient treated with corticosteroids and nebulizers, oxygen requirements improved and weaned to near baseline prior to discharge. Tadalafil remains on hold.  12-23-24 Patient hospitalized at [place] last week for sustained dizziness and numbness in her feet. CTA with large airway disease and pulmonary hypertension, no PE, Dilated thoracic aortic aneurysm which will need vascular surveillance. Spoke to pulmonology again. Dr. Fowler recommended patient stop tadalafil for the next 3 days to assess worsening/improvement of symptoms. Patient returned to [place] ER 12/22 with chest pain and had another CTA which did not reveal a PE. Of note, Zenker's diverticulum noted increased in size with food particles.  Spoke with  and patient via telephone to discuss 2 ER visits over the last week, first for dizziness that was persistent. Pt had CTA and PE ruled out, TTE showed normal RV size/function, unable to measure PASP, severe LAE, diastolic dysfunction, and PDA likely incomplete closure. Her ECG did not demonstrate ischemic changes and her trops were negative, we discussed discontinuing tadalafil as it may be contributing to her dizziness and I would call them today. In the interim, she went back to the ER yesterday for sudden chest pain, grabbed her chest "like a heart attack" per the , the pain did not subside right away and they returned to ED. In ED she was still having pain, but repeat CTA, ECG, and cardiac enzymes without acute cardiopulmonary disease. Known dilated PA, enlarged ZD of esophagus noted.    notes that her breathing is definitely better and her dizziness has improved since holding the tadalafil, He also notes she has been significant constipated and complained of gas pains prior to leaving the ED which improved when she was able to use the restroom. She does not excessive daytime fatigue which feels worse from prior.

## 2025-01-03 NOTE — ASSESSMENT
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 At risk for multifactorial PH: WHO group I (congenital s/p PDA closure) vs WHO group II (diastolic dysfunction, enlarged LA) vs WHO Group III (combined restriction/obstructive pattern, CT chest with faint centrilobular GGO and mosaicism, dilated PA 5.4cm, and dilated esophagus with debris). 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 PFTs with obstruction and restriction + severely reduced DLCO  (FEV1/FVC 59%, FEV1 39%, FVC 63%, TLC 62%, DLCO 41%, FVC/DCLO 1.5). 6MWT 183m severely reduced walk distance. RHC done 9/9/24 - severe precapillary disease with low CO/CI. Shunt run suggestive of bidirectional shunt, possible opening of PFO but this is likely protective due to severe PH (SVC to PA increase of 5%, Ao sat 89%)  REVEAL RISK: INT (if considered idiopathic)   Started on dual oral therapy due to concern of more complicated/titrated medications with her significant cognitive decline and difficulty with medications. Initially noted definite improvement although some episodic sudden RIVAS for which may have been more related to airway disease and stepped up advair therapy. Now her breathing symptoms are improved but she is having frequent dizzy spells. Holter monitor was placed on 12/16/24, she has had multiple episodes since then and now 2 ER visits. Given worsening dizziness without worsening RV size/function and only mild increase in NTproBNP, decision made to temporarily stop tadalafil. Her mutlifactorial PH with group I, II, and III RF may make her intolerant to dual oral therapy. PDE5i more likely to cause dizziness than ERA.    Ultimately may need further work up for PDA including cMRI and/or repeat RHC. Given she feels improved off tadalafil, we will continue with monotherapy until follow up visit in 3 weeks. Will also reach out to cardiology to discuss evaluating monitor early to r/o arrhythmias during these multiple events at home. Lastly, would need to consider LM coronary artery compression by dilated PA but this seems less likely. May need to consider chest pain was more related to esophageal disease and constipation noted on CT. Will also obtain home sleep study to r/o DEION.  If she was recurrence of her chest pain and/or progression of her dizziness, we have advised her  to bring to ED and plan for transfer to [facility] (or come straight to [facility] if feasible) for admission to further assess for LM compression, PDA shunting, and progression of PAH with RHC.   Plan: - HOLD tadalafil until next follow up - F/U holter monitor readings to r/o arrhythmia, sent message to Dr. Dionisio Lomeli - Home sleep study - Reassess in 3 weeks and will plan for either cMRI and/or repeat RHC to reassess shunt and PH, may need referral to Dr. García (congenital) - If she again has chest pain and/or worsening dizziness we have asked that she be transferred to [facility] for more in-depth work up as outlined above.  F/U 3 weeks

## 2025-01-03 NOTE — PROCEDURE
[FreeTextEntry1] : 12/18/24 ECHO at [facility] 1. Left ventricular cavity is normal in size. Left ventricular systolic function is normal with an ejection fraction of 77 % by Jacobo's method of disks. 2. There is mild (grade 1) left ventricular diastolic dysfunction. 3. There is increased LV mass and concentric hypertrophy. 4. Normal right ventricular cavity size and normal right ventricular systolic function. 5. Left atrium is severely dilated. 6. The right atrium is mildly dilated. 7. Aortic root at the sinuses of Valsalva is dilated, measuring 4.20 cm (indexed 2.70 cm/m). Ascending aorta is dilated, measuring 4.40 cm (indexed 2.83 cm/m). 8. The main pulmonary artery is dilated at 4.6 cm. There appears to be a PDA. 9. Mild aortic regurgitation. 10. Moderate pulmonic regurgitation. 11. Mild mitral regurgitation. 12. Pulmonary artery systolic pressure could not be estimated. 13. No pericardial effusion seen. 14. Compared to the transthoracic echocardiogram performed on 7/3/2024, main PA is more dilated, there appears to be a PDA. ***** 12/18/24 CTA CHEST at [facility] 1. No pulmonary embolism. Dilated main pulmonary artery suggestive of pulmonary hypertension.    2. Scattered areas of small area and large airway disease. Numerous lung nodules, probably  infectious/inflammatory in nature. 13 mm nodule at the basilar right lower lobe. Recommend follow-up CT chest in 6-12 months to ensure stability.    3. Aneurysmal thoracic aorta measuring up to 4.4 cm at the ascending aorta. Continued surveillance  is recommended.    4. Partial opacification of the hepatic veins suggestive of right heart failure.    5. Outpouching at the left side of the upper esophagus, probably a [place] Nilam diverticulum.     ***** 9/9/24 RHC Baseline /87/113, SpO2 89% on room air, SVC 57%, RA 65% PA 62% RA 10 RV 65/12 PA 69/34/41 PAWP 13 TP 28, DPG 21, PVR 9.3 LONG CO/CI (td) 3.0/1/9, SV 51, Svi 21 SVR 2746 dynes  PVR/SVR 0.27 Brennon 3.4, RVSWI 13.5  Val HR 51 /84/114, SpO2 100% PA 60/27/34 PAWP 15 CO/CI 73/1.75, SV 53, Svi 34 TPG 19, DPG 12  PVR 6.9  Severe precapillary disease, while she is at risk for WHO Group II and III PH, her hemodynamics are most consistent with  group I phenotype. Discussed with patient and  at bedside post procedure re: need for vasodilator therapy. Will plan for tadalafil + Opsumit for ease of polypharmacy, likely would benefit from full triple therapy + sotatercept, may be limited due to medication side effects, adherence and comorbidities. To follow up in clinic tomorrow to discuss further.  ***** 8/27/24 Pro   Na 146 (H) A1c 5.5% HIV, Hepatitis B and C non reactive Centromere Ab 2.7 (H) ESR, Rheumatoid factor, TSH, Scleroderma, ds DNA, Sjogren's SHERLY  WNL ***** 7/30/24 CT CHEST 1. Nonspecific bilateral pulmonary nodules measuring up to 1.3 cm in the right lower lobe. Recommend follow-up chest CT in 3 months to determine stability.  2. Severely dilated pulmonary artery representing pulmonary hypertension. Mosaic attenuation of the lungs likely secondary to pulmonary hypertension. **** 7/22/24 PFT FVC 1.65 (63) -> 1.85 (71) FEV1 0.77 (38) -> 0.78 (39) FEV1/FVC 59 -> 53 TLC 3.07 (62) DLCO 7.58 (41) There is moderate obstruction w/o sig BD response. Moderate restriction. No Air trapping. DLCO severely reduced *** 7/22/24 6MWT 183 meters Resting SpO2 92% on room air. Desaturation to 88% requiring supplemental O2 at 5L to maintain SpO2 > 88%. Mild Dyspnea (Mir 1) and fatigue (Mir 2.5). No rests taken. **** 7/3/24 ECHO 1. Left ventricular cavity is normal in size. Left ventricular systolic function is normal with an ejection fraction of 69 % by Jacobo's method of disks.  2. There is mild (grade 1) left ventricular diastolic dysfunction.  3. Mild left ventricular hypertrophy.  4. Normal right ventricular cavity size and normal right ventricular systolic function.  5. The left atrium is mildly dilated.  6. The right atrium is mildly dilated.  7. Aortic root at the sinuses of Valsalva is dilated, measuring 4.20 cm (indexed 2.74 cm/m). Ascending aorta diameter is dilated, measuring 4.50 cm (indexed 2.93 cm/m).  8. Mild aortic regurgitation.  9. Mild to moderate pulmonic regurgitation.  10. Dilated pulmonary artery. Main PA measures 4.0 cm. 1 1. Mild mitral regurgitation.  12. Mild tricuspid regurgitation.  13. Estimated pulmonary artery systolic pressure is 66 mmHg, consistent with severe pulmonary hypertension.  14. No pericardial effusion seen. 15. Compared to the transthoracic echocardiogram performed on 8/7/2018, ascending aorta is dilated, severe PHTN is present.

## 2025-01-14 NOTE — HISTORY OF PRESENT ILLNESS
[TextBox_4] : 73 year old F with asthma (Flovent Diskus) on cognitive disability and hearing loss here for evaluation pulmonary hypertension after seeing Dr. Janel Lomeli for cyanosis and shortness of breath. She is accompanied by her  who provides most of the history. In 2022 she had COVID pneumonia and was hospitalized. She did not require invasive ventilation and was monitored for a few days. Since then she has had significant neurologic decline most notable for short term memory loss. She also has a longstanding history of asthma since adulthood. She does not recall how she was diagnosed or if she's ever had PFTs, on flovent. In 4/2024 she was hospitalized at [facility] for pneumonia and discharged home on O2. Since discharge she has felt more fatigued, and with increased dyspnea on minimal exertion. She also notes that this was ongoing prior to her hospitalization but the pneumonia worsened it. She has a history of PDA requiring surgical closure and had CHF related to pregnancy many years ago. At our initial visit she had cyanosis of hands making SpO2 difficult, noted SOB w/ 100 ft. Underwent RHC which demonstrated severe precap PH (mPA 41, PAWP 13, CO/CI 3/1.9, PVR 9). Given severity of PVR it was felt this was vascular phenotype despite group II and III disease, she was placed on dual therapy w/ macitentan/tadalafil   PH Regimen [tadalafil 20 mg daily started October 2024, HOLDING Dec 18 2024, restarting today 1/2025] macitentan (Opsumit) 10 mg [started October 2024] sotatercept [not started yet, working on authorization process as of 1/2025]  1-14-25 Patient hospitalized again 12/30/24 - 12/31/24 for acute on chronic respiratory failure with hypoxia secondary asthma  exacerbation associated w/ acute RSV infection. Patient treated with corticosteroids and nebulizers, oxygen requirements improved and weaned to near baseline prior to discharge. She saw her PCP last week, CXR done at that time, they did not start any antibiotics as she is getting better from an RSV perspective daily. Using advair 500 inh BID w. some restlessness at night otherwise tolerating.  Tadalafil remains on hold. Lasix held for 1 week as well. She continues to have almost daily dizziness/unsteadiness episodes that last about 2 minutes. They usually occur before 1 PM and after she eats breakfast and gets up. Also when she gets up to go to the bathroom at night but have gotten better from that standpoint.  says dizziness has continued despite stopping tadalafil and lasix w/o significant improvement. Holter monitor was placed but results are still unknown. Episodes improve w/ laying flat and drinking cold water. BP 90 - 100 Systolic when checked during episodes, normal oxygen levels usually. reports all in all her excercise tolerance around the house has gotten better and reports less cyanosis of the distal finger tips since starting medications for PH.

## 2025-01-14 NOTE — ASSESSMENT
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 At risk for multifactorial PH: WHO group I (congenital s/p PDA closure) vs WHO group II (diastolic dysfunction, enlarged LA) vs WHO Group III (combined restriction/obstructive pattern, CT chest with faint centrilobular GGO and mosaicism, dilated PA 5.4cm, and dilated esophagus with debris). 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 PFTs with obstruction and restriction + severely reduced DLCO  (FEV1/FVC 59%, FEV1 39%, FVC 63%, TLC 62%, DLCO 41%, FVC/DCLO 1.5). 6MWT 183m severely reduced walk distance. RHC done 9/9/24 - severe precapillary disease with low CO/CI. Shunt run suggestive of bidirectional shunt, possible opening of PFO but this is likely protective due to severe PH (SVC to PA increase of 5%, Ao sat 89%)  REVEAL RISK: INT (if considered idiopathic)   Started on dual oral therapy due to concern of more complicated/titrated medications with her significant cognitive decline and difficulty with medications. Initially noted definite improvement although some episodic sudden RIVAS for which may have been more related to airway disease and stepped up Advair therapy. Now her breathing symptoms are improved but she is having frequent dizzy spells. Holter monitor was placed on 12/16/24, she has had multiple episodes since then and now 3 ER visits. Given worsening dizziness without worsening RV size/function and only mild increase in NTproBNP, decision made to temporarily hold tadalafil. Her multifactorial PH with group I, II, and III RF may make her intolerant to dual oral therapy vs. her dizziness could be a result of worsening PH but it is difficult to tell.   Ultimately may need further work up for PDA including cMRI and/or repeat RHC. Per husbands report, she has metal coil so difficult to obtain cardiac MRI, can do alternative imaging like cardiac CT and adult congenital heart disease specialist referral. Initially thought to be better regarding dizziness off tadalafil but now not noting difference so we will trial a restart of the tadalafil 20 mg daily. Reached out to cardiology to discuss evaluating monitor early to r/o arrhythmias during these multiple events at home, awaiting response. Instructed patient to call Dr. Lomeli as well. Lastly, would need to consider LM coronary artery compression by dilated PA but this seems less likely. May need to consider chest pain was more related to esophageal disease and constipation noted on CT. Thankfully both her constipation and chest pain have resolved. awaiting home sleep study to r/o DEION.  If she notes progression of her dizziness, we have advised her  to bring to ED and plan for transfer to [facility] (or come straight to [facility] if feasible) for admission to further assess for LM compression, PDA shunting, and progression of PAH with RHC.   Plan: - restart tadafil today, observe for any worsening symptoms of dizziness - can continue holding lasix, monitoring weight and restarting lasix after calling us if her weight is up - continue opsimut - will start authorization process for sotatercept today - F/U holter monitor readings to r/o arrhythmia, sent message to Dr. Dionisio Lomeli - Home sleep study was not done due to hospitalization - Will need to consider imaging of her congenital heart disease w/ cMRI (coil in chest is her PDA coil, MRI compatible) - re-ordered oxygen concentrator per patient's request  RTC in 2 months, video visit for first sotatercept injection

## 2025-01-14 NOTE — ASSESSMENT
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 At risk for multifactorial PH: WHO group I (congenital s/p PDA closure) vs WHO group II (diastolic dysfunction, enlarged LA) vs WHO Group III (combined restriction/obstructive pattern, CT chest with faint centrilobular GGO and mosaicism, dilated PA 5.4cm, and dilated esophagus with debris). 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 PFTs with obstruction and restriction + severely reduced DLCO  (FEV1/FVC 59%, FEV1 39%, FVC 63%, TLC 62%, DLCO 41%, FVC/DCLO 1.5). 6MWT 183m severely reduced walk distance. RHC done 9/9/24 - severe precapillary disease with low CO/CI. Shunt run suggestive of bidirectional shunt, possible opening of PFO but this is likely protective due to severe PH (SVC to PA increase of 5%, Ao sat 89%)  REVEAL RISK: INT (if considered idiopathic)   Started on dual oral therapy due to concern of more complicated/titrated medications with her significant cognitive decline and difficulty with medications. Initially noted definite improvement although some episodic sudden RIVAS for which may have been more related to airway disease and stepped up Advair therapy. Now her breathing symptoms are improved but she is having frequent dizzy spells. Holter monitor was placed on 12/16/24, she has had multiple episodes since then and now 3 ER visits. Given worsening dizziness without worsening RV size/function and only mild increase in NTproBNP, decision made to temporarily hold tadalafil. Her multifactorial PH with group I, II, and III RF may make her intolerant to dual oral therapy vs. her dizziness could be a result of worsening PH but it is difficult to tell.   Ultimately may need further work up for PDA including cMRI and/or repeat RHC. Per husbands report, she has metal coil so difficult to obtain cardiac MRI, can do alternative imaging like cardiac CT and adult congenital heart disease specialist referral. Initially thought to be better regarding dizziness off tadalafil but now not noting difference so we will trial a restart of the tadalafil 20 mg daily. Reached out to cardiology to discuss evaluating monitor early to r/o arrhythmias during these multiple events at home, awaiting response. Instructed patient to call Dr. Lomeli as well. Lastly, would need to consider LM coronary artery compression by dilated PA but this seems less likely. May need to consider chest pain was more related to esophageal disease and constipation noted on CT. Thankfully both her constipation and chest pain have resolved. awaiting home sleep study to r/o DEION.  If she notes progression of her dizziness, we have advised her  to bring to ED and plan for transfer to [facility] (or come straight to [facility] if feasible) for admission to further assess for LM compression, PDA shunting, and progression of PAH with RHC.   Plan: - restart tadafil today, observe for any worsening symptoms of dizziness - can continue holding lasix, monitoring weight and restarting lasix after calling us if her weight is up - continue opsimut - will start authorization process for sotatercept today - F/U holter monitor readings to r/o arrhythmia, sent message to Dr. Dionisio Lomeli - Home sleep study was not done due to hospitalization - Will need to consider imaging of her congenital heart disease w/ cMRI (coil in chest is her PDA coil, MRI compatible) - re-ordered oxygen concentrator per patient's request  RTC in 2 months, video visit for first sotatercept injection

## 2025-01-14 NOTE — PROCEDURE
[FreeTextEntry1] : 12/18/24 ECHO at [place] 1. Left ventricular cavity is normal in size. Left ventricular systolic function is normal with an ejection fraction of 77 % by Jacobo's method of disks. 2. There is mild (grade 1) left ventricular diastolic dysfunction. 3. There is increased LV mass and concentric hypertrophy. 4. Normal right ventricular cavity size and normal right ventricular systolic function. 5. Left atrium is severely dilated. 6. The right atrium is mildly dilated. 7. Aortic root at the sinuses of Valsalva is dilated, measuring 4.20 cm (indexed 2.70 cm/m). Ascending aorta is dilated, measuring 4.40 cm (indexed 2.83 cm/m). 8. The main pulmonary artery is dilated at 4.6 cm. There appears to be a PDA. 9. Mild aortic regurgitation. 10. Moderate pulmonic regurgitation. 11. Mild mitral regurgitation. 12. Pulmonary artery systolic pressure could not be estimated. 13. No pericardial effusion seen. 14. Compared to the transthoracic echocardiogram performed on 7/3/2024, main PA is more dilated, there appears to be a PDA. ***** 12/18/24 CTA CHEST at [place] 1. No pulmonary embolism. Dilated main pulmonary artery suggestive of pulmonary hypertension.    2. Scattered areas of small area and large airway disease. Numerous lung nodules, probably  infectious/inflammatory in nature. 13 mm nodule at the basilar right lower lobe. Recommend follow-up CT chest in 6-12 months to ensure stability.    3. Aneurysmal thoracic aorta measuring up to 4.4 cm at the ascending aorta. Continued surveillance  is recommended.    4. Partial opacification of the hepatic veins suggestive of right heart failure.    5. Outpouching at the left side of the upper esophagus, probably a [place] diverticulum.     ***** 9/9/24 RHC Baseline /87/113, SpO2 89% on room air, SVC 57%, RA 65% PA 62% RA 10 RV 65/12 PA 69/34/41 PAWP 13 TP 28, DPG 21, PVR 9.3 LONG CO/CI (td) 3.0/1/9, SV 51, Svi 21 SVR 2746 dynes  PVR/SVR 0.27 Brennon 3.4, RVSWI 13.5  Val HR 51 /84/114, SpO2 100% PA 60/27/34 PAWP 15 CO/CI 73/1.75, SV 53, Svi 34 TPG 19, DPG 12  PVR 6.9  Severe precapillary disease, while she is at risk for WHO Group II and III PH, her hemodynamics are most consistent with  group I phenotype. Discussed with patient and  at bedside post procedure re: need for vasodilator therapy. Will plan for tadalafil + Opsumit for ease of polypharmacy, likely would benefit from full triple therapy + sotatercept, may be limited due to medication side effects, adherence and comorbidities. To follow up in clinic tomorrow to discuss further.  ***** 8/27/24 Pro   Na 146 (H) A1c 5.5% HIV, Hepatitis B and C non reactive Centromere Ab 2.7 (H) ESR, Rheumatoid factor, TSH, Scleroderma, ds DNA, Sjogren's SHERLY  WNL ***** 7/30/24 CT CHEST 1. Nonspecific bilateral pulmonary nodules measuring up to 1.3 cm in the right lower lobe. Recommend follow-up chest CT in 3 months to determine stability.  2. Severely dilated pulmonary artery representing pulmonary hypertension. Mosaic attenuation of the lungs likely secondary to pulmonary hypertension. **** 7/22/24 PFT FVC 1.65 (63) -> 1.85 (71) FEV1 0.77 (38) -> 0.78 (39) FEV1/FVC 59 -> 53 TLC 3.07 (62) DLCO 7.58 (41) There is moderate obstruction w/o sig BD response. Moderate restriction. No Air trapping. DLCO severely reduced *** 7/22/24 6MWT 183 meters Resting SpO2 92% on room air. Desaturation to 88% requiring supplemental O2 at 5L to maintain SpO2 > 88%. Mild Dyspnea (Mir 1) and fatigue (Mir 2.5). No rests taken. **** 7/3/24 ECHO 1. Left ventricular cavity is normal in size. Left ventricular systolic function is normal with an ejection fraction of 69 % by Jacobo's method of disks.  2. There is mild (grade 1) left ventricular diastolic dysfunction.  3. Mild left ventricular hypertrophy.  4. Normal right ventricular cavity size and normal right ventricular systolic function.  5. The left atrium is mildly dilated.  6. The right atrium is mildly dilated.  7. Aortic root at the sinuses of Valsalva is dilated, measuring 4.20 cm (indexed 2.74 cm/m). Ascending aorta diameter is dilated, measuring 4.50 cm (indexed 2.93 cm/m).  8. Mild aortic regurgitation.  9. Mild to moderate pulmonic regurgitation.  10. Dilated pulmonary artery. Main PA measures 4.0 cm. 1 1. Mild mitral regurgitation.  12. Mild tricuspid regurgitation.  13. Estimated pulmonary artery systolic pressure is 66 mmHg, consistent with severe pulmonary hypertension.  14. No pericardial effusion seen. 15. Compared to the transthoracic echocardiogram performed on 8/7/2018, ascending aorta is dilated, severe PHTN is present.

## 2025-01-14 NOTE — ASSESSMENT
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 At risk for multifactorial PH: WHO group I (congenital s/p PDA closure) vs WHO group II (diastolic dysfunction, enlarged LA) vs WHO Group III (combined restriction/obstructive pattern, CT chest with faint centrilobular GGO and mosaicism, dilated PA 5.4cm, and dilated esophagus with debris). 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 PFTs with obstruction and restriction + severely reduced DLCO  (FEV1/FVC 59%, FEV1 39%, FVC 63%, TLC 62%, DLCO 41%, FVC/DCLO 1.5). 6MWT 183m severely reduced walk distance. RHC done 9/9/24 - severe precapillary disease with low CO/CI. Shunt run suggestive of bidirectional shunt, possible opening of PFO but this is likely protective due to severe PH (SVC to PA increase of 5%, Ao sat 89%)  REVEAL RISK: INT (if considered idiopathic)   Started on dual oral therapy due to concern of more complicated/titrated medications with her significant cognitive decline and difficulty with medications. Initially noted definite improvement although some episodic sudden RIVAS for which may have been more related to airway disease and stepped up Advair therapy. Now her breathing symptoms are improved but she is having frequent dizzy spells. Holter monitor was placed on 12/16/24, she has had multiple episodes since then and now 3 ER visits. Given worsening dizziness without worsening RV size/function and only mild increase in NTproBNP, decision made to temporarily hold tadalafil. Her multifactorial PH with group I, II, and III RF may make her intolerant to dual oral therapy vs. her dizziness could be a result of worsening PH but it is difficult to tell.   Ultimately may need further work up for PDA including cMRI and/or repeat RHC. Per husbands report, she has metal coil so difficult to obtain cardiac MRI, can do alternative imaging like cardiac CT and adult congenital heart disease specialist referral. Initially thought to be better regarding dizziness off tadalafil but now not noting difference so we will trial a restart of the tadalafil 20 mg daily. Reached out to cardiology to discuss evaluating monitor early to r/o arrhythmias during these multiple events at home, awaiting response. Instructed patient to call Dr. Lomeli as well. Lastly, would need to consider LM coronary artery compression by dilated PA but this seems less likely. May need to consider chest pain was more related to esophageal disease and constipation noted on CT. Thankfully both her constipation and chest pain have resolved. awaiting home sleep study to r/o DEION.  If she notes progression of her dizziness, we have advised her  to bring to ED and plan for transfer to [facility] (or come straight to [facility] if feasible) for admission to further assess for LM compression, PDA shunting, and progression of PAH with RHC.   Plan: - restart tadafil today, observe for any worsening symptoms of dizziness - can continue holding lasix, monitoring weight and restarting lasix after calling us if her weight is up - continue opsimut - will start authorization process for sotatercept today - F/U holter monitor readings to r/o arrhythmia, sent message to Dr. Dionisio Lomeli - Home sleep study was not done due to hospitalization - Will need to consider imaging of her congenital heart disease w/ cMRI (coil in chest is her PDA coil, MRI compatible) - re-ordered oxygen concentrator per patient's request  RTC in 2 months, video visit for first sotatercept injection

## 2025-01-14 NOTE — HISTORY OF PRESENT ILLNESS
[TextBox_4] : 73 year old F with asthma (Flovent Diskus) on cognitive disability and hearing loss here for evaluation pulmonary hypertension after seeing Dr. Janel Lomeli for cyanosis and shortness of breath. She is accompanied by her  who provides most of the history. In 2022 she had COVID pneumonia and was hospitalized. She did not require invasive ventilation and was monitored for a few days. Since then she has had significant neurologic decline most notable for short term memory loss. She also has a longstanding history of asthma since adulthood. She does not recall how she was diagnosed or if she's ever had PFTs, on flovent. In 4/2024 she was hospitalized at [facility] for pneumonia and discharged home on O2. Since discharge she has felt more fatigued, and with increased dyspnea on minimal exertion. She also notes that this was ongoing prior to her hospitalization but the pneumonia worsened it. She has a history of PDA requiring surgical closure and had CHF related to pregnancy many years ago. At our initial visit she had cyanosis of hands making SpO2 difficult, noted SOB w/ 100 ft. Underwent RHC which demonstrated severe precap PH (mPA 41, PAWP 13, CO/CI 3/1.9, PVR 9). Given severity of PVR it was felt this was vascular phenotype despite group II and III disease, she was placed on dual therapy w/ macitentan/tadalafil   PH Regimen [tadalafil 20 mg daily started October 2024, HOLDING Dec 18 2024, restarting today 1/2025] macitentan (Opsumit) 10 mg [started October 2024] sotatercept [not started yet, working on authorization process as of 1/2025]  1-14-25 Patient hospitalized again 12/30/24 - 12/31/24 for acute on chronic respiratory failure with hypoxia secondary asthma  exacerbation associated w/ acute RSV infection. Patient treated with corticosteroids and nebulizers, oxygen requirements improved and weaned to near baseline prior to discharge. She saw her PCP last week, CXR done at that time, they did not start any antibiotics as she is getting better from an RSV perspective daily. Using advair 500 inh BID w. some restlessness at night otherwise tolerating.  Tadalafil remains on hold. Lasix held for 1 week as well. She continues to have almost daily dizziness/unsteadiness episodes that last about 2 minutes. They usually occur before 1 PM and after she eats breakfast and gets up. Also when she gets up to go to the bathroom at night but have gotten better from that standpoint.  says dizziness has continued despite stopping tadalafil and lasix w/o significant improvement. Holter monitor was placed but results are still unknown. Episodes improve w/ laying flat and drinking cold water. BP 90 - 100 Systolic when checked during episodes, normal oxygen levels usually. reports all in all her excercise tolerance around the house has gotten better and reports less cyanosis of the distal finger tips since starting medications for PH.

## 2025-01-14 NOTE — HISTORY OF PRESENT ILLNESS
[TextBox_4] : 73 year old F with asthma (Flovent Diskus) on cognitive disability and hearing loss here for evaluation pulmonary hypertension after seeing Dr. Janel Lomeli for cyanosis and shortness of breath. She is accompanied by her  who provides most of the history. In 2022 she had COVID pneumonia and was hospitalized. She did not require invasive ventilation and was monitored for a few days. Since then she has had significant neurologic decline most notable for short term memory loss. She also has a longstanding history of asthma since adulthood. She does not recall how she was diagnosed or if she's ever had PFTs, on flovent. In 4/2024 she was hospitalized at [facility] for pneumonia and discharged home on O2. Since discharge she has felt more fatigued, and with increased dyspnea on minimal exertion. She also notes that this was ongoing prior to her hospitalization but the pneumonia worsened it. She has a history of PDA requiring surgical closure and had CHF related to pregnancy many years ago. At our initial visit she had cyanosis of hands making SpO2 difficult, noted SOB w/ 100 ft. Underwent RHC which demonstrated severe precap PH (mPA 41, PAWP 13, CO/CI 3/1.9, PVR 9). Given severity of PVR it was felt this was vascular phenotype despite group II and III disease, she was placed on dual therapy w/ macitentan/tadalafil   PH Regimen [tadalafil 20 mg daily started October 2024, HOLDING Dec 18 2024, restarting today 1/2025] macitentan (Opsumit) 10 mg [started October 2024] sotatercept [not started yet, working on authorization process as of 1/2025]  1-14-25 Patient hospitalized again 12/30/24 - 12/31/24 for acute on chronic respiratory failure with hypoxia secondary asthma  exacerbation associated w/ acute RSV infection. Patient treated with corticosteroids and nebulizers, oxygen requirements improved and weaned to near baseline prior to discharge. She saw her PCP last week, CXR done at that time, they did not start any antibiotics as she is getting better from an RSV perspective daily. Using advair 500 inh BID w. some restlessness at night otherwise tolerating.  Tadalafil remains on hold. Lasix held for 1 week as well. She continues to have almost daily dizziness/unsteadiness episodes that last about 2 minutes. They usually occur before 1 PM and after she eats breakfast and gets up. Also when she gets up to go to the bathroom at night but have gotten better from that standpoint.  says dizziness has continued despite stopping tadalafil and lasix w/o significant improvement. Holter monitor was placed but results are still unknown. Episodes improve w/ laying flat and drinking cold water. BP 90 - 100 Systolic when checked during episodes, normal oxygen levels usually. reports all in all her excercise tolerance around the house has gotten better and reports less cyanosis of the distal finger tips since starting medications for PH.

## 2025-01-14 NOTE — PROCEDURE
[FreeTextEntry1] : 12/18/24 ECHO at [place] 1. Left ventricular cavity is normal in size. Left ventricular systolic function is normal with an ejection fraction of 77 % by Jacobo's method of disks. 2. There is mild (grade 1) left ventricular diastolic dysfunction. 3. There is increased LV mass and concentric hypertrophy. 4. Normal right ventricular cavity size and normal right ventricular systolic function. 5. Left atrium is severely dilated. 6. The right atrium is mildly dilated. 7. Aortic root at the sinuses of Valsalva is dilated, measuring 4.20 cm (indexed 2.70 cm/m). Ascending aorta is dilated, measuring 4.40 cm (indexed 2.83 cm/m). 8. The main pulmonary artery is dilated at 4.6 cm. There appears to be a PDA. 9. Mild aortic regurgitation. 10. Moderate pulmonic regurgitation. 11. Mild mitral regurgitation. 12. Pulmonary artery systolic pressure could not be estimated. 13. No pericardial effusion seen. 14. Compared to the transthoracic echocardiogram performed on 7/3/2024, main PA is more dilated, there appears to be a PDA. ***** 12/18/24 CTA CHEST at [place] 1. No pulmonary embolism. Dilated main pulmonary artery suggestive of pulmonary hypertension.    2. Scattered areas of small area and large airway disease. Numerous lung nodules, probably  infectious/inflammatory in nature. 13 mm nodule at the basilar right lower lobe. Recommend follow-up CT chest in 6-12 months to ensure stability.    3. Aneurysmal thoracic aorta measuring up to 4.4 cm at the ascending aorta. Continued surveillance  is recommended.    4. Partial opacification of the hepatic veins suggestive of right heart failure.    5. 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     ***** 9/9/24 RHC Baseline /87/113, SpO2 89% on room air, SVC 57%, RA 65% PA 62% RA 10 RV 65/12 PA 69/34/41 PAWP 13 TP 28, DPG 21, PVR 9.3 LONG CO/CI (td) 3.0/1/9, SV 51, Svi 21 SVR 2746 dynes  PVR/SVR 0.27 Brennon 3.4, RVSWI 13.5  Val HR 51 /84/114, SpO2 100% PA 60/27/34 PAWP 15 CO/CI 73/1.75, SV 53, Svi 34 TPG 19, DPG 12  PVR 6.9  Severe precapillary disease, while she is at risk for WHO Group II and III PH, her hemodynamics are most consistent with  group I phenotype. Discussed with patient and  at bedside post procedure re: need for vasodilator therapy. Will plan for tadalafil + Opsumit for ease of polypharmacy, likely would benefit from full triple therapy + sotatercept, may be limited due to medication side effects, adherence and comorbidities. To follow up in clinic tomorrow to discuss further.  ***** 8/27/24 Pro   Na 146 (H) A1c 5.5% HIV, Hepatitis B and C non reactive Centromere Ab 2.7 (H) ESR, Rheumatoid factor, TSH, Scleroderma, ds DNA, Sjogren's SHERLY  WNL ***** 7/30/24 CT CHEST 1. Nonspecific bilateral pulmonary nodules measuring up to 1.3 cm in the right lower lobe. Recommend follow-up chest CT in 3 months to determine stability.  2. Severely dilated pulmonary artery representing pulmonary hypertension. Mosaic attenuation of the lungs likely secondary to pulmonary hypertension. **** 7/22/24 PFT FVC 1.65 (63) -> 1.85 (71) FEV1 0.77 (38) -> 0.78 (39) FEV1/FVC 59 -> 53 TLC 3.07 (62) DLCO 7.58 (41) There is moderate obstruction w/o sig BD response. Moderate restriction. No Air trapping. DLCO severely reduced *** 7/22/24 6MWT 183 meters Resting SpO2 92% on room air. Desaturation to 88% requiring supplemental O2 at 5L to maintain SpO2 > 88%. Mild Dyspnea (Mir 1) and fatigue (Mir 2.5). No rests taken. **** 7/3/24 ECHO 1. Left ventricular cavity is normal in size. Left ventricular systolic function is normal with an ejection fraction of 69 % by Jacobo's method of disks.  2. There is mild (grade 1) left ventricular diastolic dysfunction.  3. Mild left ventricular hypertrophy.  4. Normal right ventricular cavity size and normal right ventricular systolic function.  5. The left atrium is mildly dilated.  6. The right atrium is mildly dilated.  7. Aortic root at the sinuses of Valsalva is dilated, measuring 4.20 cm (indexed 2.74 cm/m). Ascending aorta diameter is dilated, measuring 4.50 cm (indexed 2.93 cm/m).  8. Mild aortic regurgitation.  9. Mild to moderate pulmonic regurgitation.  10. Dilated pulmonary artery. Main PA measures 4.0 cm. 1 1. Mild mitral regurgitation.  12. Mild tricuspid regurgitation.  13. Estimated pulmonary artery systolic pressure is 66 mmHg, consistent with severe pulmonary hypertension.  14. No pericardial effusion seen. 15. Compared to the transthoracic echocardiogram performed on 8/7/2018, ascending aorta is dilated, severe PHTN is present.

## 2025-01-14 NOTE — PROCEDURE
[FreeTextEntry1] : 12/18/24 ECHO at [place] 1. Left ventricular cavity is normal in size. Left ventricular systolic function is normal with an ejection fraction of 77 % by Jacobo's method of disks. 2. There is mild (grade 1) left ventricular diastolic dysfunction. 3. There is increased LV mass and concentric hypertrophy. 4. Normal right ventricular cavity size and normal right ventricular systolic function. 5. Left atrium is severely dilated. 6. The right atrium is mildly dilated. 7. Aortic root at the sinuses of Valsalva is dilated, measuring 4.20 cm (indexed 2.70 cm/m). Ascending aorta is dilated, measuring 4.40 cm (indexed 2.83 cm/m). 8. The main pulmonary artery is dilated at 4.6 cm. There appears to be a PDA. 9. Mild aortic regurgitation. 10. Moderate pulmonic regurgitation. 11. Mild mitral regurgitation. 12. Pulmonary artery systolic pressure could not be estimated. 13. No pericardial effusion seen. 14. Compared to the transthoracic echocardiogram performed on 7/3/2024, main PA is more dilated, there appears to be a PDA. ***** 12/18/24 CTA CHEST at [place] 1. No pulmonary embolism. Dilated main pulmonary artery suggestive of pulmonary hypertension.    2. Scattered areas of small area and large airway disease. Numerous lung nodules, probably  infectious/inflammatory in nature. 13 mm nodule at the basilar right lower lobe. Recommend follow-up CT chest in 6-12 months to ensure stability.    3. Aneurysmal thoracic aorta measuring up to 4.4 cm at the ascending aorta. Continued surveillance  is recommended.    4. Partial opacification of the hepatic veins suggestive of right heart failure.    5. 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     ***** 9/9/24 RHC Baseline /87/113, SpO2 89% on room air, SVC 57%, RA 65% PA 62% RA 10 RV 65/12 PA 69/34/41 PAWP 13 TP 28, DPG 21, PVR 9.3 LONG CO/CI (td) 3.0/1/9, SV 51, Svi 21 SVR 2746 dynes  PVR/SVR 0.27 Brennon 3.4, RVSWI 13.5  Val HR 51 /84/114, SpO2 100% PA 60/27/34 PAWP 15 CO/CI 73/1.75, SV 53, Svi 34 TPG 19, DPG 12  PVR 6.9  Severe precapillary disease, while she is at risk for WHO Group II and III PH, her hemodynamics are most consistent with  group I phenotype. Discussed with patient and  at bedside post procedure re: need for vasodilator therapy. Will plan for tadalafil + Opsumit for ease of polypharmacy, likely would benefit from full triple therapy + sotatercept, may be limited due to medication side effects, adherence and comorbidities. To follow up in clinic tomorrow to discuss further.  ***** 8/27/24 Pro   Na 146 (H) A1c 5.5% HIV, Hepatitis B and C non reactive Centromere Ab 2.7 (H) ESR, Rheumatoid factor, TSH, Scleroderma, ds DNA, Sjogren's SHERLY  WNL ***** 7/30/24 CT CHEST 1. Nonspecific bilateral pulmonary nodules measuring up to 1.3 cm in the right lower lobe. Recommend follow-up chest CT in 3 months to determine stability.  2. Severely dilated pulmonary artery representing pulmonary hypertension. Mosaic attenuation of the lungs likely secondary to pulmonary hypertension. **** 7/22/24 PFT FVC 1.65 (63) -> 1.85 (71) FEV1 0.77 (38) -> 0.78 (39) FEV1/FVC 59 -> 53 TLC 3.07 (62) DLCO 7.58 (41) There is moderate obstruction w/o sig BD response. Moderate restriction. No Air trapping. DLCO severely reduced *** 7/22/24 6MWT 183 meters Resting SpO2 92% on room air. Desaturation to 88% requiring supplemental O2 at 5L to maintain SpO2 > 88%. Mild Dyspnea (Mir 1) and fatigue (Mir 2.5). No rests taken. **** 7/3/24 ECHO 1. Left ventricular cavity is normal in size. Left ventricular systolic function is normal with an ejection fraction of 69 % by Jacobo's method of disks.  2. There is mild (grade 1) left ventricular diastolic dysfunction.  3. Mild left ventricular hypertrophy.  4. Normal right ventricular cavity size and normal right ventricular systolic function.  5. The left atrium is mildly dilated.  6. The right atrium is mildly dilated.  7. Aortic root at the sinuses of Valsalva is dilated, measuring 4.20 cm (indexed 2.74 cm/m). Ascending aorta diameter is dilated, measuring 4.50 cm (indexed 2.93 cm/m).  8. Mild aortic regurgitation.  9. Mild to moderate pulmonic regurgitation.  10. Dilated pulmonary artery. Main PA measures 4.0 cm. 1 1. Mild mitral regurgitation.  12. Mild tricuspid regurgitation.  13. Estimated pulmonary artery systolic pressure is 66 mmHg, consistent with severe pulmonary hypertension.  14. No pericardial effusion seen. 15. Compared to the transthoracic echocardiogram performed on 8/7/2018, ascending aorta is dilated, severe PHTN is present.

## 2025-03-06 NOTE — PROCEDURE
[FreeTextEntry1] : CBC #2 3/5. Hgb 14.6, Plt 120 ***** 2/13/25 Home Sleep Study AHI 2 with saba SpO2 87% No evidence of sleep apnea ***** 1/15/25 LABS CBC canceled (specimen not labeled) Creatinine 0.84 LFTs WNL Pro  **** 12/18/24 ECHO at [place] 1. Left ventricular cavity is normal in size. Left ventricular systolic function is normal with an ejection fraction of 77 % by Jacobo's method of disks. 2. There is mild (grade 1) left ventricular diastolic dysfunction. 3. There is increased LV mass and concentric hypertrophy. 4. Normal right ventricular cavity size and normal right ventricular systolic function. 5. Left atrium is severely dilated. 6. The right atrium is mildly dilated. 7. Aortic root at the sinuses of Valsalva is dilated, measuring 4.20 cm (indexed 2.70 cm/m). Ascending aorta is dilated, measuring 4.40 cm (indexed 2.83 cm/m). 8. The main pulmonary artery is dilated at 4.6 cm. There appears to be a PDA. 9. Mild aortic regurgitation. 10. Moderate pulmonic regurgitation. 11. Mild mitral regurgitation. 12. Pulmonary artery systolic pressure could not be estimated. 13. No pericardial effusion seen. 14. Compared to the transthoracic echocardiogram performed on 7/3/2024, main PA is more dilated, there appears to be a PDA. ***** 12/18/24 CTA CHEST at [place] 1. No pulmonary embolism. Dilated main pulmonary artery suggestive of pulmonary hypertension.    2. Scattered areas of small area and large airway disease. Numerous lung nodules, probably  infectious/inflammatory in nature. 13 mm nodule at the basilar right lower lobe. Recommend follow-up CT chest in 6-12 months to ensure stability.    3. Aneurysmal thoracic aorta measuring up to 4.4 cm at the ascending aorta. Continued surveillance  is recommended.    4. Partial opacification of the hepatic veins suggestive of right heart failure.    5. 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     ***** 9/9/24 RHC Baseline /87/113, SpO2 89% on room air, SVC 57%, RA 65% PA 62% RA 10 RV 65/12 PA 69/34/41 PAWP 13 TP 28, DPG 21, PVR 9.3 LONG CO/CI (td) 3.0/1/9, SV 51, Svi 21 SVR 2746 dynes  PVR/SVR 0.27 Brennon 3.4, RVSWI 13.5  Val HR 51 /84/114, SpO2 100% PA 60/27/34 PAWP 15 CO/CI 73/1.75, SV 53, Svi 34 TPG 19, DPG 12  PVR 6.9  Severe precapillary disease, while she is at risk for WHO Group II and III PH, her hemodynamics are most consistent with  group I phenotype. Discussed with patient and  at bedside post procedure re: need for vasodilator therapy. Will plan for tadalafil + Opsumit for ease of polypharmacy, likely would benefit from full triple therapy + sotatercept, may be limited due to medication side effects, adherence and comorbidities. To follow up in clinic tomorrow to discuss further.  ***** 8/27/24 Pro   Na 146 (H) A1c 5.5% HIV, Hepatitis B and C non reactive Centromere Ab 2.7 (H) ESR, Rheumatoid factor, TSH, Scleroderma, ds DNA, Sjogren's SHERLY  WNL ***** 7/30/24 CT CHEST 1. Nonspecific bilateral pulmonary nodules measuring up to 1.3 cm in the right lower lobe. Recommend follow-up chest CT in 3 months to determine stability.  2. Severely dilated pulmonary artery representing pulmonary hypertension. Mosaic attenuation of the lungs likely secondary to pulmonary hypertension. **** 7/22/24 PFT FVC 1.65 (63) -> 1.85 (71) FEV1 0.77 (38) -> 0.78 (39) FEV1/FVC 59 -> 53 TLC 3.07 (62) DLCO 7.58 (41) There is moderate obstruction w/o sig BD response. Moderate restriction. No Air trapping. DLCO severely reduced *** 7/22/24 6MWT 183 meters Resting SpO2 92% on room air. Desaturation to 88% requiring supplemental O2 at 5L to maintain SpO2 > 88%. Mild Dyspnea (Mir 1) and fatigue (Mir 2.5). No rests taken. **** 7/3/24 ECHO 1. Left ventricular cavity is normal in size. Left ventricular systolic function is normal with an ejection fraction of 69 % by Jacobo's method of disks.  2. There is mild (grade 1) left ventricular diastolic dysfunction.  3. Mild left ventricular hypertrophy.  4. Normal right ventricular cavity size and normal right ventricular systolic function.  5. The left atrium is mildly dilated.  6. The right atrium is mildly dilated.  7. Aortic root at the sinuses of Valsalva is dilated, measuring 4.20 cm (indexed 2.74 cm/m). Ascending aorta diameter is dilated, measuring 4.50 cm (indexed 2.93 cm/m).  8. Mild aortic regurgitation.  9. Mild to moderate pulmonic regurgitation.  10. Dilated pulmonary artery. Main PA measures 4.0 cm. 1 1. Mild mitral regurgitation.  12. Mild tricuspid regurgitation.  13. Estimated pulmonary artery systolic pressure is 66 mmHg, consistent with severe pulmonary hypertension.  14. No pericardial effusion seen. 15. Compared to the transthoracic echocardiogram performed on 8/7/2018, ascending aorta is dilated, severe PHTN is present.

## 2025-03-06 NOTE — HISTORY OF PRESENT ILLNESS
[TextBox_4] : 73 year old F with asthma (Flovent Diskus) on cognitive disability and hearing loss here for evaluation pulmonary hypertension after seeing Dr. Janel Lomeli for cyanosis and shortness of breath. She is accompanied by her  who provides most of the history. In 2022 she had COVID pneumonia and was hospitalized. She did not require invasive ventilation and was monitored for a few days. Since then she has had significant neurologic decline most notable for short term memory loss. She also has a longstanding history of asthma since adulthood. She does not recall how she was diagnosed or if she's ever had PFTs, on flovent. In 4/2024 she was hospitalized at [facility] for pneumonia and discharged home on O2. Since discharge she has felt more fatigued, and with increased dyspnea on minimal exertion. She also notes that this was ongoing prior to her hospitalization but the pneumonia worsened it. She has a history of PDA requiring surgical closure and had CHF related to pregnancy many years ago. At our initial visit she had cyanosis of hands making SpO2 difficult, noted SOB w/ 100 ft. Underwent RHC which demonstrated severe precap PH (mPA 41, PAWP 13, CO/CI 3/1.9, PVR 9). Given severity of PVR it was felt this was vascular phenotype despite group II and III disease, she was placed on dual therapy w/ macitentan/tadalafil   PH Regimen [tadalafil 20 mg daily started October 2024, held Dec 18 2024, restart 1/2025] macitentan (Opsumit) 10 mg [started October 2024] sotatercept [started 2/19/25]  3-6-25 Resumed tadalafil 20 mg daily 1/14/25 Still having occasional vertigo. Suggested asking PCP about vestibular therapy. Started Winrevair 2/19/25. Developed petechiae 2 days after the injection. Plt count was 117 (was 106 in December). Not having easy bruising or bleeding. She repeated CBC 3/5. Hgb 14.6, Plt 120 The petechiae on her leg has resolved.  She feels she is breathing better. Walking from car to front door is much easier. Walking around the mall is less exhausting for her.  did not review the Holter results with Dr. Lomeli yet sleep study results did not reveal DEION and no significant amount of time as SpO2 < 88% re-ordered O2 concentrator. She uses it in the morning if she is busy and needs to catch her breath. Long walks. uses 2L  they purchased a rolling walker because her walking is off balance She has been feeling ok. the lightheadedness has diminished significantly.  wt 117 -119 stable. Went up to 120 and restarted Lasix 2 days ago. No dizziness when off furosemide.  Wt today 118.9 Has uterine prolapse and the pessary is not working adequately. She is having discomfort from that. She may require surgery at some point.

## 2025-03-06 NOTE — ASSESSMENT
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 At risk for multifactorial PH: WHO group I (congenital s/p PDA closure) vs WHO group II (diastolic dysfunction, enlarged LA) vs WHO Group III (combined restriction/obstructive pattern, CT chest with faint centrilobular GGO and mosaicism, dilated PA 5.4cm, and dilated esophagus with debris). 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 PFTs with obstruction and restriction + severely reduced DLCO  (FEV1/FVC 59%, FEV1 39%, FVC 63%, TLC 62%, DLCO 41%, FVC/DCLO 1.5). 6MWT 183m severely reduced walk distance. RHC done 9/9/24 - severe precapillary disease with low CO/CI. Shunt run suggestive of bidirectional shunt, possible opening of PFO but this is likely protective due to severe PH (SVC to PA increase of 5%, Ao sat 89%)  REVEAL RISK: INT (if considered idiopathic)   Started on dual oral therapy due to concern of more complicated/titrated medications with her significant cognitive decline and difficulty with medications. Initially noted definite improvement although some episodic sudden RIVAS for which may have been more related to airway disease and stepped up Advair therapy. Now her breathing symptoms are improved but she is having frequent dizzy spells. Holter monitor was placed on 12/16/24, she has had multiple episodes since then and 3 ER visits. Given worsening dizziness without worsening RV size/function and only mild increase in NTproBNP, decision made to temporarily hold tadalafil. Her multifactorial PH with group I, II, and III RF may make her intolerant to dual oral therapy vs. her dizziness could be a result of worsening PH but it is difficult to tell.   Ultimately may need further work up for PDA including cMRI and/or repeat RHC. Per husbands report, she has metal coil so difficult to obtain cardiac MRI, can do alternative imaging like cardiac CT and adult congenital heart disease specialist referral. Initially thought to be better regarding dizziness off tadalafil but even off it had episodes. More recently they note that she had no dizziness the 4 days she was off furosemide recently. Holter did not reveal significant arrhythmias but they have not met with Dr. Lomeli to review the results. May need to consider LM coronary artery compression by dilated PA but this seems less likely. May need to consider chest pain was more related to esophageal disease and constipation noted on CT. Both her constipation and chest pain have resolved. Home sleep study ruled out DEION.  If she notes progression of her dizziness, we have advised her  to bring to ED and plan for transfer to [facility] (or come straight to [facility] if feasible) for admission to further assess for LM compression, PDA shunting, and progression of PAH with RHC.   Plan: - Continue tadalafil 20 mg daily - can continue holding lasix, monitoring weight and restarting lasix after calling us if her weight is up - Instructed  to administer 10 mg of Lasix instead of 10 mg when weight is > = 120 pounds. Perhaps more gentle diuresis will result in less lightheadedness. Warned it may take a few extra days to get the fluid off but she may be less symptomatic.  - continue Opsumit - Continue sotatercept. Will receive first target dose 3/19 (had initial dose 2/19) - Repeat CBC on 3/26 prior to 3rd dose 4/2 - F/U holter monitor readings with Dr. Dionisio Lomeli - use 2L O2 as needed to maintain SpO2 >88%. Pt is ambulatory in the home and community and benefits from portable oxygen - Will need to consider imaging of her congenital heart disease w/ cMRI (coil in chest is her PDA coil, MRI compatible)  RTC in April, video visit for third sotatercept injection 3/27 (lab 3/26)

## 2025-03-06 NOTE — REASON FOR VISIT
[Spouse] : spouse [Home] : at home, [unfilled] , at the time of the visit. [Medical Office: ([facility])___] : at the medical office located in  [Telehealth (audio & video)] : This visit was provided via telehealth using real-time 2-way audio visual technology. [Follow-Up] : a follow-up visit [Pulmonary Hypertension] : pulmonary hypertension [Shortness of Breath] : shortness of breath [FreeTextEntry3] :  Yessenia Nicolas Complex Repair And Xenograft Text: The defect edges were debeveled with a #15 scalpel blade.  The primary defect was closed partially with a complex linear closure.  Given the location of the defect, shape of the defect and the proximity to free margins a xenograft was deemed most appropriate to repair the remaining defect.  The graft was trimmed to fit the size of the remaining defect.  The graft was then placed in the primary defect, oriented appropriately, and sutured into place.

## 2025-03-06 NOTE — REASON FOR VISIT
[Spouse] : spouse [Home] : at home, [unfilled] , at the time of the visit. [Medical Office: ([facility])___] : at the medical office located in  [Telehealth (audio & video)] : This visit was provided via telehealth using real-time 2-way audio visual technology. [Follow-Up] : a follow-up visit [Pulmonary Hypertension] : pulmonary hypertension [Shortness of Breath] : shortness of breath [FreeTextEntry3] :  Yessenia Nicolas

## 2025-03-06 NOTE — ASSESSMENT
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 At risk for multifactorial PH: WHO group I (congenital s/p PDA closure) vs WHO group II (diastolic dysfunction, enlarged LA) vs WHO Group III (combined restriction/obstructive pattern, CT chest with faint centrilobular GGO and mosaicism, dilated PA 5.4cm, and dilated esophagus with debris). 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 PFTs with obstruction and restriction + severely reduced DLCO  (FEV1/FVC 59%, FEV1 39%, FVC 63%, TLC 62%, DLCO 41%, FVC/DCLO 1.5). 6MWT 183m severely reduced walk distance. RHC done 9/9/24 - severe precapillary disease with low CO/CI. Shunt run suggestive of bidirectional shunt, possible opening of PFO but this is likely protective due to severe PH (SVC to PA increase of 5%, Ao sat 89%)  REVEAL RISK: INT (if considered idiopathic)   Started on dual oral therapy due to concern of more complicated/titrated medications with her significant cognitive decline and difficulty with medications. Initially noted definite improvement although some episodic sudden RIVAS for which may have been more related to airway disease and stepped up Advair therapy. Now her breathing symptoms are improved but she is having frequent dizzy spells. Holter monitor was placed on 12/16/24, she has had multiple episodes since then and 3 ER visits. Given worsening dizziness without worsening RV size/function and only mild increase in NTproBNP, decision made to temporarily hold tadalafil. Her multifactorial PH with group I, II, and III RF may make her intolerant to dual oral therapy vs. her dizziness could be a result of worsening PH but it is difficult to tell.   Ultimately may need further work up for PDA including cMRI and/or repeat RHC. Per husbands report, she has metal coil so difficult to obtain cardiac MRI, can do alternative imaging like cardiac CT and adult congenital heart disease specialist referral. Initially thought to be better regarding dizziness off tadalafil but even off it had episodes. More recently they note that she had no dizziness the 4 days she was off furosemide recently. Holter did not reveal significant arrhythmias but they have not met with Dr. Lomeli to review the results. May need to consider LM coronary artery compression by dilated PA but this seems less likely. May need to consider chest pain was more related to esophageal disease and constipation noted on CT. Both her constipation and chest pain have resolved. Home sleep study ruled out DEOIN.  If she notes progression of her dizziness, we have advised her  to bring to ED and plan for transfer to [facility] (or come straight to [facility] if feasible) for admission to further assess for LM compression, PDA shunting, and progression of PAH with RHC.   Plan: - Continue tadalafil 20 mg daily - can continue holding lasix, monitoring weight and restarting lasix after calling us if her weight is up - Instructed  to administer 10 mg of Lasix instead of 10 mg when weight is > = 120 pounds. Perhaps more gentle diuresis will result in less lightheadedness. Warned it may take a few extra days to get the fluid off but she may be less symptomatic.  - continue Opsumit - Continue sotatercept. Will receive first target dose 3/19 (had initial dose 2/19) - Repeat CBC on 3/26 prior to 3rd dose 4/2 - F/U holter monitor readings with Dr. Dionisio Lomeli - use 2L O2 as needed to maintain SpO2 >88%. Pt is ambulatory in the home and community and benefits from portable oxygen - Will need to consider imaging of her congenital heart disease w/ cMRI (coil in chest is her PDA coil, MRI compatible)  RTC in April, video visit for third sotatercept injection 3/27 (lab 3/26)

## 2025-03-06 NOTE — HISTORY OF PRESENT ILLNESS
[TextBox_4] : 73 year old F with asthma (Flovent Diskus) on cognitive disability and hearing loss here for evaluation pulmonary hypertension after seeing Dr. Janel Lomeli for cyanosis and shortness of breath. She is accompanied by her  who provides most of the history. In 2022 she had COVID pneumonia and was hospitalized. She did not require invasive ventilation and was monitored for a few days. Since then she has had significant neurologic decline most notable for short term memory loss. She also has a longstanding history of asthma since adulthood. She does not recall how she was diagnosed or if she's ever had PFTs, on flovent. In 4/2024 she was hospitalized at [facility] for pneumonia and discharged home on O2. Since discharge she has felt more fatigued, and with increased dyspnea on minimal exertion. She also notes that this was ongoing prior to her hospitalization but the pneumonia worsened it. She has a history of PDA requiring surgical closure and had CHF related to pregnancy many years ago. At our initial visit she had cyanosis of hands making SpO2 difficult, noted SOB w/ 100 ft. Underwent RHC which demonstrated severe precap PH (mPA 41, PAWP 13, CO/CI 3/1.9, PVR 9). Given severity of PVR it was felt this was vascular phenotype despite group II and III disease, she was placed on dual therapy w/ macitentan/tadalafil   PH Regimen [tadalafil 20 mg daily started October 2024, held Dec 18 2024, restart 1/2025] macitentan (Opsumit) 10 mg [started October 2024] sotatercept [started 2/19/25]  3-6-25 Resumed tadalafil 20 mg daily 1/14/25 Still having occasional vertigo. Suggested asking PCP about vestibular therapy. Started Winrevair 2/19/25. Developed petechiae 2 days after the injection. Plt count was 117 (was 106 in December). Not having easy bruising or bleeding. She repeated CBC 3/5. Hgb 14.6, Plt 120 The petechiae on her leg has resolved.  She feels she is breathing better. Walking from car to front door is much easier. Walking around the mall is less exhausting for her.  did not review the Holter results with Dr. Lomeli yet sleep study results did not reveal DEION and no significant amount of time as SpO2 < 88% re-ordered O2 concentrator. She uses it in the morning if she is busy and needs to catch her breath. Long walks. uses 2L  they purchased a rolling walker because her walking is off balance She has been feeling ok. the lightheadedness has diminished significantly.  wt 117 -119 stable. Went up to 120 and restarted Lasix 2 days ago. No dizziness when off furosemide.  Wt today 118.9 Has uterine prolapse and the pessary is not working adequately. She is having discomfort from that. She may require surgery at some point.

## 2025-03-06 NOTE — PROCEDURE
[FreeTextEntry1] : CBC #2 3/5. Hgb 14.6, Plt 120 ***** 2/13/25 Home Sleep Study AHI 2 with saba SpO2 87% No evidence of sleep apnea ***** 1/15/25 LABS CBC canceled (specimen not labeled) Creatinine 0.84 LFTs WNL Pro  **** 12/18/24 ECHO at [place] 1. Left ventricular cavity is normal in size. Left ventricular systolic function is normal with an ejection fraction of 77 % by Jacobo's method of disks. 2. There is mild (grade 1) left ventricular diastolic dysfunction. 3. There is increased LV mass and concentric hypertrophy. 4. Normal right ventricular cavity size and normal right ventricular systolic function. 5. Left atrium is severely dilated. 6. The right atrium is mildly dilated. 7. Aortic root at the sinuses of Valsalva is dilated, measuring 4.20 cm (indexed 2.70 cm/m). Ascending aorta is dilated, measuring 4.40 cm (indexed 2.83 cm/m). 8. The main pulmonary artery is dilated at 4.6 cm. There appears to be a PDA. 9. Mild aortic regurgitation. 10. Moderate pulmonic regurgitation. 11. Mild mitral regurgitation. 12. Pulmonary artery systolic pressure could not be estimated. 13. No pericardial effusion seen. 14. Compared to the transthoracic echocardiogram performed on 7/3/2024, main PA is more dilated, there appears to be a PDA. ***** 12/18/24 CTA CHEST at [place] 1. No pulmonary embolism. Dilated main pulmonary artery suggestive of pulmonary hypertension.    2. Scattered areas of small area and large airway disease. Numerous lung nodules, probably  infectious/inflammatory in nature. 13 mm nodule at the basilar right lower lobe. Recommend follow-up CT chest in 6-12 months to ensure stability.    3. Aneurysmal thoracic aorta measuring up to 4.4 cm at the ascending aorta. Continued surveillance  is recommended.    4. Partial opacification of the hepatic veins suggestive of right heart failure.    5. 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     ***** 9/9/24 RHC Baseline /87/113, SpO2 89% on room air, SVC 57%, RA 65% PA 62% RA 10 RV 65/12 PA 69/34/41 PAWP 13 TP 28, DPG 21, PVR 9.3 LONG CO/CI (td) 3.0/1/9, SV 51, Svi 21 SVR 2746 dynes  PVR/SVR 0.27 Brennon 3.4, RVSWI 13.5  Val HR 51 /84/114, SpO2 100% PA 60/27/34 PAWP 15 CO/CI 73/1.75, SV 53, Svi 34 TPG 19, DPG 12  PVR 6.9  Severe precapillary disease, while she is at risk for WHO Group II and III PH, her hemodynamics are most consistent with  group I phenotype. Discussed with patient and  at bedside post procedure re: need for vasodilator therapy. Will plan for tadalafil + Opsumit for ease of polypharmacy, likely would benefit from full triple therapy + sotatercept, may be limited due to medication side effects, adherence and comorbidities. To follow up in clinic tomorrow to discuss further.  ***** 8/27/24 Pro   Na 146 (H) A1c 5.5% HIV, Hepatitis B and C non reactive Centromere Ab 2.7 (H) ESR, Rheumatoid factor, TSH, Scleroderma, ds DNA, Sjogren's SHERLY  WNL ***** 7/30/24 CT CHEST 1. Nonspecific bilateral pulmonary nodules measuring up to 1.3 cm in the right lower lobe. Recommend follow-up chest CT in 3 months to determine stability.  2. Severely dilated pulmonary artery representing pulmonary hypertension. Mosaic attenuation of the lungs likely secondary to pulmonary hypertension. **** 7/22/24 PFT FVC 1.65 (63) -> 1.85 (71) FEV1 0.77 (38) -> 0.78 (39) FEV1/FVC 59 -> 53 TLC 3.07 (62) DLCO 7.58 (41) There is moderate obstruction w/o sig BD response. Moderate restriction. No Air trapping. DLCO severely reduced *** 7/22/24 6MWT 183 meters Resting SpO2 92% on room air. Desaturation to 88% requiring supplemental O2 at 5L to maintain SpO2 > 88%. Mild Dyspnea (Mir 1) and fatigue (Mir 2.5). No rests taken. **** 7/3/24 ECHO 1. Left ventricular cavity is normal in size. Left ventricular systolic function is normal with an ejection fraction of 69 % by Jacobo's method of disks.  2. There is mild (grade 1) left ventricular diastolic dysfunction.  3. Mild left ventricular hypertrophy.  4. Normal right ventricular cavity size and normal right ventricular systolic function.  5. The left atrium is mildly dilated.  6. The right atrium is mildly dilated.  7. Aortic root at the sinuses of Valsalva is dilated, measuring 4.20 cm (indexed 2.74 cm/m). Ascending aorta diameter is dilated, measuring 4.50 cm (indexed 2.93 cm/m).  8. Mild aortic regurgitation.  9. Mild to moderate pulmonic regurgitation.  10. Dilated pulmonary artery. Main PA measures 4.0 cm. 1 1. Mild mitral regurgitation.  12. Mild tricuspid regurgitation.  13. Estimated pulmonary artery systolic pressure is 66 mmHg, consistent with severe pulmonary hypertension.  14. No pericardial effusion seen. 15. Compared to the transthoracic echocardiogram performed on 8/7/2018, ascending aorta is dilated, severe PHTN is present.

## 2025-03-17 NOTE — ASSESSMENT
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 At risk for multifactorial PH: WHO group I (congenital s/p PDA closure) vs WHO group II (diastolic dysfunction, enlarged LA) vs WHO Group III (combined restriction/obstructive pattern, CT chest with faint centrilobular GGO and mosaicism, dilated PA 5.4cm, and dilated esophagus with debris). 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 PFTs with obstruction and restriction + severely reduced DLCO  (FEV1/FVC 59%, FEV1 39%, FVC 63%, TLC 62%, DLCO 41%, FVC/DCLO 1.5). 6MWT 183m severely reduced walk distance. RHC done 9/9/24 - severe precapillary disease with low CO/CI. Shunt run suggestive of bidirectional shunt, possible opening of PFO but this is likely protective due to severe PH (SVC to PA increase of 5%, Ao sat 89%)  REVEAL RISK: INT (if considered idiopathic)   Started on dual oral therapy due to concern of more complicated/titrated medications with her significant cognitive decline and difficulty with medications. Initially noted definite improvement although some episodic sudden RIVAS for which may have been more related to airway disease and stepped up Advair therapy. Now her breathing symptoms are improved but she is having frequent dizzy spells. Holter monitor was placed on 12/16/24, she has had multiple episodes since then and 3 ER visits. Given worsening dizziness without worsening RV size/function and only mild increase in NTproBNP, decision made to temporarily hold tadalafil. Her multifactorial PH with group I, II, and III RF may make her intolerant to dual oral therapy vs. her dizziness could be a result of worsening PH but it is difficult to tell.   Ultimately may need further work up for PDA including cMRI and/or repeat RHC. Per husbands report, she has metal coil so difficult to obtain cardiac MRI, can do alternative imaging like cardiac CT and adult congenital heart disease specialist referral. Initially thought to be better regarding dizziness off tadalafil but even off it had episodes. More recently they note that she had no dizziness the 4 days she was off furosemide recently. Holter did not reveal significant arrhythmias but they have not met with Dr. Lomeli to review the results. May need to consider LM coronary artery compression by dilated PA but this seems less likely. May need to consider chest pain was more related to esophageal disease and constipation noted on CT. Both her constipation and chest pain have resolved. Home sleep study ruled out DEION.  If she notes progression of her dizziness, we have advised her  to bring to ED and plan for transfer to [facility] (or come straight to [facility] if feasible) for admission to further assess for LM compression, PDA shunting, and progression of PAH with RHC.   Plan: - Continue tadalafil 20 mg daily - can continue holding lasix, monitoring weight and restarting lasix after calling us if her weight is up - Instructed  to administer 10 mg of Lasix instead of 10 mg when weight is > = 120 pounds. Perhaps more gentle diuresis will result in less lightheadedness. Warned it may take a few extra days to get the fluid off but she may be less symptomatic.  - continue Opsumit - Continue sotatercept. Will receive first target dose 3/19 (had initial dose 2/19) - Repeat CBC on 3/26 prior to 3rd dose 4/2 - F/U holter monitor readings with Dr. Dionisio Lomeli - use 2L O2 as needed to maintain SpO2 >88%. Pt is ambulatory in the home and community and benefits from portable oxygen - Will need to consider imaging of her congenital heart disease w/ cMRI (coil in chest is her PDA coil, MRI compatible)  RTC in April, video visit for third sotatercept injection 3/27 (lab 3/26)

## 2025-03-17 NOTE — REASON FOR VISIT
[Spouse] : spouse [Home] : at home, [unfilled] , at the time of the visit. [Medical Office: ([facility])___] : at the medical office located in  [Telehealth (audio & video)] : This visit was provided via telehealth using real-time 2-way audio visual technology. [FreeTextEntry3] :  Yessenia Nicolas [Follow-Up] : a follow-up visit [Pulmonary Hypertension] : pulmonary hypertension [Shortness of Breath] : shortness of breath

## 2025-03-17 NOTE — PROCEDURE
[FreeTextEntry1] : CBC #2 3/5. Hgb 14.6, Plt 120 ***** 2/13/25 Home Sleep Study AHI 2 with saba SpO2 87% No evidence of sleep apnea ***** 1/15/25 LABS CBC canceled (specimen not labeled) Creatinine 0.84 LFTs WNL Pro  **** 12/18/24 ECHO at [place] 1. Left ventricular cavity is normal in size. Left ventricular systolic function is normal with an ejection fraction of 77 % by Jacobo's method of disks. 2. There is mild (grade 1) left ventricular diastolic dysfunction. 3. There is increased LV mass and concentric hypertrophy. 4. Normal right ventricular cavity size and normal right ventricular systolic function. 5. Left atrium is severely dilated. 6. The right atrium is mildly dilated. 7. Aortic root at the sinuses of Valsalva is dilated, measuring 4.20 cm (indexed 2.70 cm/m). Ascending aorta is dilated, measuring 4.40 cm (indexed 2.83 cm/m). 8. The main pulmonary artery is dilated at 4.6 cm. There appears to be a PDA. 9. Mild aortic regurgitation. 10. Moderate pulmonic regurgitation. 11. Mild mitral regurgitation. 12. Pulmonary artery systolic pressure could not be estimated. 13. No pericardial effusion seen. 14. Compared to the transthoracic echocardiogram performed on 7/3/2024, main PA is more dilated, there appears to be a PDA. ***** 12/18/24 CTA CHEST at [place] 1. No pulmonary embolism. Dilated main pulmonary artery suggestive of pulmonary hypertension.    2. Scattered areas of small area and large airway disease. Numerous lung nodules, probably  infectious/inflammatory in nature. 13 mm nodule at the basilar right lower lobe. Recommend follow-up CT chest in 6-12 months to ensure stability.    3. Aneurysmal thoracic aorta measuring up to 4.4 cm at the ascending aorta. Continued surveillance  is recommended.    4. Partial opacification of the hepatic veins suggestive of right heart failure.    5. 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     ***** 9/9/24 RHC Baseline /87/113, SpO2 89% on room air, SVC 57%, RA 65% PA 62% RA 10 RV 65/12 PA 69/34/41 PAWP 13 TP 28, DPG 21, PVR 9.3 LONG CO/CI (td) 3.0/1/9, SV 51, Svi 21 SVR 2746 dynes  PVR/SVR 0.27 Brennon 3.4, RVSWI 13.5  Val HR 51 /84/114, SpO2 100% PA 60/27/34 PAWP 15 CO/CI 73/1.75, SV 53, Svi 34 TPG 19, DPG 12  PVR 6.9  Severe precapillary disease, while she is at risk for WHO Group II and III PH, her hemodynamics are most consistent with  group I phenotype. Discussed with patient and  at bedside post procedure re: need for vasodilator therapy. Will plan for tadalafil + Opsumit for ease of polypharmacy, likely would benefit from full triple therapy + sotatercept, may be limited due to medication side effects, adherence and comorbidities. To follow up in clinic tomorrow to discuss further.  ***** 8/27/24 Pro   Na 146 (H) A1c 5.5% HIV, Hepatitis B and C non reactive Centromere Ab 2.7 (H) ESR, Rheumatoid factor, TSH, Scleroderma, ds DNA, Sjogren's SHERLY  WNL ***** 7/30/24 CT CHEST 1. Nonspecific bilateral pulmonary nodules measuring up to 1.3 cm in the right lower lobe. Recommend follow-up chest CT in 3 months to determine stability.  2. Severely dilated pulmonary artery representing pulmonary hypertension. Mosaic attenuation of the lungs likely secondary to pulmonary hypertension. **** 7/22/24 PFT FVC 1.65 (63) -> 1.85 (71) FEV1 0.77 (38) -> 0.78 (39) FEV1/FVC 59 -> 53 TLC 3.07 (62) DLCO 7.58 (41) There is moderate obstruction w/o sig BD response. Moderate restriction. No Air trapping. DLCO severely reduced *** 7/22/24 6MWT 183 meters Resting SpO2 92% on room air. Desaturation to 88% requiring supplemental O2 at 5L to maintain SpO2 > 88%. Mild Dyspnea (Mir 1) and fatigue (Mir 2.5). No rests taken. **** 7/3/24 ECHO 1. Left ventricular cavity is normal in size. Left ventricular systolic function is normal with an ejection fraction of 69 % by Jacobo's method of disks.  2. There is mild (grade 1) left ventricular diastolic dysfunction.  3. Mild left ventricular hypertrophy.  4. Normal right ventricular cavity size and normal right ventricular systolic function.  5. The left atrium is mildly dilated.  6. The right atrium is mildly dilated.  7. Aortic root at the sinuses of Valsalva is dilated, measuring 4.20 cm (indexed 2.74 cm/m). Ascending aorta diameter is dilated, measuring 4.50 cm (indexed 2.93 cm/m).  8. Mild aortic regurgitation.  9. Mild to moderate pulmonic regurgitation.  10. Dilated pulmonary artery. Main PA measures 4.0 cm. 1 1. Mild mitral regurgitation.  12. Mild tricuspid regurgitation.  13. Estimated pulmonary artery systolic pressure is 66 mmHg, consistent with severe pulmonary hypertension.  14. No pericardial effusion seen. 15. Compared to the transthoracic echocardiogram performed on 8/7/2018, ascending aorta is dilated, severe PHTN is present.

## 2025-03-17 NOTE — HISTORY OF PRESENT ILLNESS
[TextBox_4] : 73 year old F with asthma (Flovent Diskus) on cognitive disability and hearing loss here for evaluation pulmonary hypertension after seeing Dr. Janel Lomeli for cyanosis and shortness of breath. She is accompanied by her  who provides most of the history. In 2022 she had COVID pneumonia and was hospitalized. She did not require invasive ventilation and was monitored for a few days. Since then she has had significant neurologic decline most notable for short term memory loss. She also has a longstanding history of asthma since adulthood. She does not recall how she was diagnosed or if she's ever had PFTs, on flovent. In 4/2024 she was hospitalized at [facility] for pneumonia and discharged home on O2. Since discharge she has felt more fatigued, and with increased dyspnea on minimal exertion. She also notes that this was ongoing prior to her hospitalization but the pneumonia worsened it. She has a history of PDA requiring surgical closure and had CHF related to pregnancy many years ago. At our initial visit she had cyanosis of hands making SpO2 difficult, noted SOB w/ 100 ft. Underwent RHC which demonstrated severe precap PH (mPA 41, PAWP 13, CO/CI 3/1.9, PVR 9). Given severity of PVR it was felt this was vascular phenotype despite group II and III disease, she was placed on dual therapy w/ macitentan/tadalafil   PH Regimen [tadalafil 20 mg daily started October 2024, held Dec 18 2024, restart 1/2025] macitentan (Opsumit) 10 mg [started October 2024] sotatercept [started 2/19/25]  3-27-25  3-6-25 Resumed tadalafil 20 mg daily 1/14/25 Still having occasional vertigo. Suggested asking PCP about vestibular therapy. Started Winrevair 2/19/25. Developed petechiae 2 days after the injection. Plt count was 117 (was 106 in December). Not having easy bruising or bleeding. She repeated CBC 3/5. Hgb 14.6, Plt 120 The petechiae on her leg has resolved.  She feels she is breathing better. Walking from car to front door is much easier. Walking around the mall is less exhausting for her.  did not review the Holter results with Dr. Lomeli yet sleep study results did not reveal DEION and no significant amount of time as SpO2 < 88% re-ordered O2 concentrator. She uses it in the morning if she is busy and needs to catch her breath. Long walks. uses 2L  they purchased a rolling walker because her walking is off balance She has been feeling ok. the lightheadedness has diminished significantly.  wt 117 -119 stable. Went up to 120 and restarted Lasix 2 days ago. No dizziness when off furosemide.  Wt today 118.9 Has uterine prolapse and the pessary is not working adequately. She is having discomfort from that. She may require surgery at some point.

## 2025-03-27 NOTE — HISTORY OF PRESENT ILLNESS
[TextBox_4] : 73 year old F with asthma (Flovent Diskus) on cognitive disability and hearing loss here for evaluation pulmonary hypertension after seeing Dr. Janel Lomeli for cyanosis and shortness of breath. She is accompanied by her  who provides most of the history. In 2022 she had COVID pneumonia and was hospitalized. She did not require invasive ventilation and was monitored for a few days. Since then she has had significant neurologic decline most notable for short term memory loss. She also has a longstanding history of asthma since adulthood. She does not recall how she was diagnosed or if she's ever had PFTs, on flovent. In 4/2024 she was hospitalized at [facility] for pneumonia and discharged home on O2. Since discharge she has felt more fatigued, and with increased dyspnea on minimal exertion. She also notes that this was ongoing prior to her hospitalization but the pneumonia worsened it. She has a history of PDA requiring surgical closure and had CHF related to pregnancy many years ago. At our initial visit she had cyanosis of hands making SpO2 difficult, noted SOB w/ 100 ft. Underwent RHC which demonstrated severe precap PH (mPA 41, PAWP 13, CO/CI 3/1.9, PVR 9). Given severity of PVR it was felt this was vascular phenotype despite group II and III disease, she was placed on dual therapy w/ macitentan/tadalafil   PH Regimen tadalafil 20 mg daily [started October 2024, held Dec 18 2024, restart 1/2025] macitentan (Opsumit) 10 mg [started October 2024] sotatercept [started 2/19/25]  -CBC #1 12/30 Hgb 13.2 [id number] -CBC #2 3/5. Hgb 14.6, Plt 120 pro  -CBC #3 3/26 Hgb 14.4 Plt 105 pro   3-27-25 Received first sotatercept target dose 3/19 (had initial dose 2/19) Repeat CBC on 3/26 prior to 3rd dose 4/2. Hgb yesterday was 14.4, Plt 105. Her breathing is noticeably better. She gets tired but not out of breath. Walked [place] nice long city block [place] [address] yesterday and was not wiped out or have to stop. She can walk at a comfortable pace and is not gasping for air. Yesterday had a little bit of dizziness and vomiting but overall things have been better.  Seems to be in the morning and brief. Has a "set back" each morning after doing Advair. Feels depleted after holding her breath for a count of 10 after each administration and has to lie down.  Has not noticed any bleeding or bruising since starting sotaterept. No telangiectasias. No epistaxis. Has been eating out lately.  Weight has been 119-120. Taking 20 mg Lasix daily Has not been using rolling walker that they purchased because her walking is off balance

## 2025-03-27 NOTE — ASSESSMENT
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 At risk for multifactorial PH: WHO group I (congenital s/p PDA closure) vs WHO group II (diastolic dysfunction, enlarged LA) vs WHO Group III (combined restriction/obstructive pattern, CT chest with faint centrilobular GGO and mosaicism, dilated PA 5.4cm, and dilated esophagus with debris). 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 PFTs with obstruction and restriction + severely reduced DLCO  (FEV1/FVC 59%, FEV1 39%, FVC 63%, TLC 62%, DLCO 41%, FVC/DCLO 1.5). 6MWT 183m severely reduced walk distance. RHC done 9/9/24 - severe precapillary disease with low CO/CI. Shunt run suggestive of bidirectional shunt, possible opening of PFO but this is likely protective due to severe PH (SVC to PA increase of 5%, Ao sat 89%)  REVEAL RISK: INT (if considered idiopathic)   Started on dual oral therapy due to concern of more complicated/titrated medications with her significant cognitive decline and difficulty with medications. Initially noted definite improvement although some episodic sudden RIVAS for which may have been more related to airway disease and stepped up Advair therapy. Now her breathing symptoms are improved but she is having frequent dizzy spells. Holter monitor was placed on 12/16/24, she has had multiple episodes since then and 3 ER visits. Given worsening dizziness without worsening RV size/function and only mild increase in NTproBNP, decision made to temporarily hold tadalafil but it has since been resumed as there was not much difference off it. Her multifactorial PH with group I, II, and III RF may make her intolerant to dual oral therapy vs. her dizziness could be a result of worsening PH vs it may be vertigo but it is difficult to tell.   Ultimately may need further work up for PDA including cMRI and/or repeat RHC. Per husbands report, she has metal coil so difficult to obtain cardiac MRI, can do alternative imaging like cardiac CT and adult congenital heart disease specialist referral. Initially thought to be better regarding dizziness off tadalafil but even off it had episodes. More recently they note that she had no dizziness the 4 days she was off furosemide recently. Holter did not reveal significant arrhythmias but they have not met with Dr. Lomeli to review the results. May need to consider LM coronary artery compression by dilated PA but this seems less likely. May need to consider chest pain was more related to esophageal disease and constipation noted on CT. Both her constipation and chest pain have resolved. Home sleep study ruled out DEION.  If she notes progression of her dizziness, we have advised her  to bring to ED and plan for transfer to [facility] (or come straight to [facility] if feasible) for admission to further assess for LM compression, PDA shunting, and progression of PAH with RHC.   Plan: - Continue tadalafil 20 mg daily - continue 20 mg Lasix daily and increase to 30 mg daily for a few days if weight exceeds 121.5 lbs - continue Opsumit - Continue sotatercept. Will receive next target dose 4/2 (Winrevair #3)  (had initial dose 2/19) - Repeat CBC on 4/16 prior to 3rd dose 4/23 - F/U holter monitor readings with Dr. Dionisio Lomeli - use 2L O2 as needed to maintain SpO2 >88%. Pt is ambulatory in the home and community and benefits from portable oxygen - Will need to consider imaging of her congenital heart disease w/ cMRI (coil in chest is her PDA coil, MRI compatible) - Counseled she does not have to hold her breath for a full 10 seconds after each Advair inhalation if it is causing discomfort. Try a count of 5 instead. If this continues to be an issue we can consider switching to nebulized solutions instead of inhaler  RTC in April, video visit for fourth sotatercept injection 4/17 1:00 (lab 4/16)

## 2025-03-27 NOTE — PROCEDURE
normal mood with appropriate affect
[FreeTextEntry1] : CBC #3 3/26 Hgb 14.4 PLt 105 ***** CBC #2 3/5. Hgb 14.6, Plt 120 ***** 2/13/25 Home Sleep Study AHI 2 with saba SpO2 87% No evidence of sleep apnea ***** 1/15/25 LABS CBC canceled (specimen not labeled) Creatinine 0.84 LFTs WNL Pro  **** 12/18/24 ECHO at [place] 1. Left ventricular cavity is normal in size. Left ventricular systolic function is normal with an ejection fraction of 77 % by Jacobo's method of disks. 2. There is mild (grade 1) left ventricular diastolic dysfunction. 3. There is increased LV mass and concentric hypertrophy. 4. Normal right ventricular cavity size and normal right ventricular systolic function. 5. Left atrium is severely dilated. 6. The right atrium is mildly dilated. 7. Aortic root at the sinuses of Valsalva is dilated, measuring 4.20 cm (indexed 2.70 cm/m). Ascending aorta is dilated, measuring 4.40 cm (indexed 2.83 cm/m). 8. The main pulmonary artery is dilated at 4.6 cm. There appears to be a PDA. 9. Mild aortic regurgitation. 10. Moderate pulmonic regurgitation. 11. Mild mitral regurgitation. 12. Pulmonary artery systolic pressure could not be estimated. 13. No pericardial effusion seen. 14. Compared to the transthoracic echocardiogram performed on 7/3/2024, main PA is more dilated, there appears to be a PDA. ***** 12/18/24 CTA CHEST at [facility] 1. No pulmonary embolism. Dilated main pulmonary artery suggestive of pulmonary hypertension.    2. Scattered areas of small area and large airway disease. Numerous lung nodules, probably  infectious/inflammatory in nature. 13 mm nodule at the basilar right lower lobe. Recommend follow-up CT chest in 6-12 months to ensure stability.    3. Aneurysmal thoracic aorta measuring up to 4.4 cm at the ascending aorta. Continued surveillance  is recommended.    4. Partial opacification of the hepatic veins suggestive of right heart failure.    5. Outpouching at the left side of the upper esophagus, probably a [place] Nilam diverticulum.     ***** 9/9/24 RHC Baseline /87/113, SpO2 89% on room air, SVC 57%, RA 65% PA 62% RA 10 RV 65/12 PA 69/34/41 PAWP 13 TP 28, DPG 21, PVR 9.3 LONG CO/CI (td) 3.0/1/9, SV 51, Svi 21 SVR 2746 dynes  PVR/SVR 0.27 Brennon 3.4, RVSWI 13.5  Val HR 51 /84/114, SpO2 100% PA 60/27/34 PAWP 15 CO/CI 73/1.75, SV 53, Svi 34 TPG 19, DPG 12  PVR 6.9  Severe precapillary disease, while she is at risk for WHO Group II and III PH, her hemodynamics are most consistent with  group I phenotype. Discussed with patient and  at bedside post procedure re: need for vasodilator therapy. Will plan for tadalafil + Opsumit for ease of polypharmacy, likely would benefit from full triple therapy + sotatercept, may be limited due to medication side effects, adherence and comorbidities. To follow up in clinic tomorrow to discuss further.  ***** 8/27/24 Pro   Na 146 (H) A1c 5.5% HIV, Hepatitis B and C non reactive Centromere Ab 2.7 (H) ESR, Rheumatoid factor, TSH, Scleroderma, ds DNA, Sjogren's SHERLY  WNL ***** 7/30/24 CT CHEST 1. Nonspecific bilateral pulmonary nodules measuring up to 1.3 cm in the right lower lobe. Recommend follow-up chest CT in 3 months to determine stability.  2. Severely dilated pulmonary artery representing pulmonary hypertension. Mosaic attenuation of the lungs likely secondary to pulmonary hypertension. **** 7/22/24 PFT FVC 1.65 (63) -> 1.85 (71) FEV1 0.77 (38) -> 0.78 (39) FEV1/FVC 59 -> 53 TLC 3.07 (62) DLCO 7.58 (41) There is moderate obstruction w/o sig BD response. Moderate restriction. No Air trapping. DLCO severely reduced *** 7/22/24 6MWT 183 meters Resting SpO2 92% on room air. Desaturation to 88% requiring supplemental O2 at 5L to maintain SpO2 > 88%. Mild Dyspnea (Mir 1) and fatigue (Mir 2.5). No rests taken. **** 7/3/24 ECHO 1. Left ventricular cavity is normal in size. Left ventricular systolic function is normal with an ejection fraction of 69 % by Jacobo's method of disks.  2. There is mild (grade 1) left ventricular diastolic dysfunction.  3. Mild left ventricular hypertrophy.  4. Normal right ventricular cavity size and normal right ventricular systolic function.  5. The left atrium is mildly dilated.  6. The right atrium is mildly dilated.  7. Aortic root at the sinuses of Valsalva is dilated, measuring 4.20 cm (indexed 2.74 cm/m). Ascending aorta diameter is dilated, measuring 4.50 cm (indexed 2.93 cm/m).  8. Mild aortic regurgitation.  9. Mild to moderate pulmonic regurgitation.  10. Dilated pulmonary artery. Main PA measures 4.0 cm. 1 1. Mild mitral regurgitation.  12. Mild tricuspid regurgitation.  13. Estimated pulmonary artery systolic pressure is 66 mmHg, consistent with severe pulmonary hypertension.  14. No pericardial effusion seen. 15. Compared to the transthoracic echocardiogram performed on 8/7/2018, ascending aorta is dilated, severe PHTN is present.  
[FreeTextEntry1] : CBC #3 3/26 Hgb 14.4 PLt 105 ***** CBC #2 3/5. Hgb 14.6, Plt 120 ***** 2/13/25 Home Sleep Study AHI 2 with saba SpO2 87% No evidence of sleep apnea ***** 1/15/25 LABS CBC canceled (specimen not labeled) Creatinine 0.84 LFTs WNL Pro  **** 12/18/24 ECHO at [facility] 1. Left ventricular cavity is normal in size. Left ventricular systolic function is normal with an ejection fraction of 77 % by Jacobo's method of disks. 2. There is mild (grade 1) left ventricular diastolic dysfunction. 3. There is increased LV mass and concentric hypertrophy. 4. Normal right ventricular cavity size and normal right ventricular systolic function. 5. Left atrium is severely dilated. 6. The right atrium is mildly dilated. 7. Aortic root at the sinuses of Valsalva is dilated, measuring 4.20 cm (indexed 2.70 cm/m). Ascending aorta is dilated, measuring 4.40 cm (indexed 2.83 cm/m). 8. The main pulmonary artery is dilated at 4.6 cm. There appears to be a PDA. 9. Mild aortic regurgitation. 10. Moderate pulmonic regurgitation. 11. Mild mitral regurgitation. 12. Pulmonary artery systolic pressure could not be estimated. 13. No pericardial effusion seen. 14. Compared to the transthoracic echocardiogram performed on 7/3/2024, main PA is more dilated, there appears to be a PDA. ***** 12/18/24 CTA CHEST at [facility] 1. No pulmonary embolism. Dilated main pulmonary artery suggestive of pulmonary hypertension.    2. Scattered areas of small area and large airway disease. Numerous lung nodules, probably  infectious/inflammatory in nature. 13 mm nodule at the basilar right lower lobe. Recommend follow-up CT chest in 6-12 months to ensure stability.    3. Aneurysmal thoracic aorta measuring up to 4.4 cm at the ascending aorta. Continued surveillance  is recommended.    4. Partial opacification of the hepatic veins suggestive of right heart failure.    5. Outpouching at the left side of the upper esophagus, probably a [place] Nilam diverticulum.     ***** 9/9/24 RHC Baseline /87/113, SpO2 89% on room air, SVC 57%, RA 65% PA 62% RA 10 RV 65/12 PA 69/34/41 PAWP 13 TP 28, DPG 21, PVR 9.3 LONG CO/CI (td) 3.0/1/9, SV 51, Svi 21 SVR 2746 dynes  PVR/SVR 0.27 Brennon 3.4, RVSWI 13.5  Val HR 51 /84/114, SpO2 100% PA 60/27/34 PAWP 15 CO/CI 73/1.75, SV 53, Svi 34 TPG 19, DPG 12  PVR 6.9  Severe precapillary disease, while she is at risk for WHO Group II and III PH, her hemodynamics are most consistent with  group I phenotype. Discussed with patient and  at bedside post procedure re: need for vasodilator therapy. Will plan for tadalafil + Opsumit for ease of polypharmacy, likely would benefit from full triple therapy + sotatercept, may be limited due to medication side effects, adherence and comorbidities. To follow up in clinic tomorrow to discuss further.  ***** 8/27/24 Pro   Na 146 (H) A1c 5.5% HIV, Hepatitis B and C non reactive Centromere Ab 2.7 (H) ESR, Rheumatoid factor, TSH, Scleroderma, ds DNA, Sjogren's SHERLY  WNL ***** 7/30/24 CT CHEST 1. Nonspecific bilateral pulmonary nodules measuring up to 1.3 cm in the right lower lobe. Recommend follow-up chest CT in 3 months to determine stability.  2. Severely dilated pulmonary artery representing pulmonary hypertension. Mosaic attenuation of the lungs likely secondary to pulmonary hypertension. **** 7/22/24 PFT FVC 1.65 (63) -> 1.85 (71) FEV1 0.77 (38) -> 0.78 (39) FEV1/FVC 59 -> 53 TLC 3.07 (62) DLCO 7.58 (41) There is moderate obstruction w/o sig BD response. Moderate restriction. No Air trapping. DLCO severely reduced *** 7/22/24 6MWT 183 meters Resting SpO2 92% on room air. Desaturation to 88% requiring supplemental O2 at 5L to maintain SpO2 > 88%. Mild Dyspnea (Mir 1) and fatigue (Mir 2.5). No rests taken. **** 7/3/24 ECHO 1. Left ventricular cavity is normal in size. Left ventricular systolic function is normal with an ejection fraction of 69 % by Jacobo's method of disks.  2. There is mild (grade 1) left ventricular diastolic dysfunction.  3. Mild left ventricular hypertrophy.  4. Normal right ventricular cavity size and normal right ventricular systolic function.  5. The left atrium is mildly dilated.  6. The right atrium is mildly dilated.  7. Aortic root at the sinuses of Valsalva is dilated, measuring 4.20 cm (indexed 2.74 cm/m). Ascending aorta diameter is dilated, measuring 4.50 cm (indexed 2.93 cm/m).  8. Mild aortic regurgitation.  9. Mild to moderate pulmonic regurgitation.  10. Dilated pulmonary artery. Main PA measures 4.0 cm. 1 1. Mild mitral regurgitation.  12. Mild tricuspid regurgitation.  13. Estimated pulmonary artery systolic pressure is 66 mmHg, consistent with severe pulmonary hypertension.  14. No pericardial effusion seen. 15. Compared to the transthoracic echocardiogram performed on 8/7/2018, ascending aorta is dilated, severe PHTN is present.

## 2025-03-27 NOTE — ASSESSMENT
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 At risk for multifactorial PH: WHO group I (congenital s/p PDA closure) vs WHO group II (diastolic dysfunction, enlarged LA) vs WHO Group III (combined restriction/obstructive pattern, CT chest with faint centrilobular GGO and mosaicism, dilated PA 5.4cm, and dilated esophagus with debris). 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 PFTs with obstruction and restriction + severely reduced DLCO  (FEV1/FVC 59%, FEV1 39%, FVC 63%, TLC 62%, DLCO 41%, FVC/DCLO 1.5). 6MWT 183m severely reduced walk distance. RHC done 9/9/24 - severe precapillary disease with low CO/CI. Shunt run suggestive of bidirectional shunt, possible opening of PFO but this is likely protective due to severe PH (SVC to PA increase of 5%, Ao sat 89%)  REVEAL RISK: INT (if considered idiopathic)   Started on dual oral therapy due to concern of more complicated/titrated medications with her significant cognitive decline and difficulty with medications. Initially noted definite improvement although some episodic sudden RIVAS for which may have been more related to airway disease and stepped up Advair therapy. Now her breathing symptoms are improved but she is having frequent dizzy spells. Holter monitor was placed on 12/16/24, she has had multiple episodes since then and 3 ER visits. Given worsening dizziness without worsening RV size/function and only mild increase in NTproBNP, decision made to temporarily hold tadalafil but it has since been resumed as there was not much difference off it. Her multifactorial PH with group I, II, and III RF may make her intolerant to dual oral therapy vs. her dizziness could be a result of worsening PH vs it may be vertigo but it is difficult to tell.   Ultimately may need further work up for PDA including cMRI and/or repeat RHC. Per husbands report, she has metal coil so difficult to obtain cardiac MRI, can do alternative imaging like cardiac CT and adult congenital heart disease specialist referral. Initially thought to be better regarding dizziness off tadalafil but even off it had episodes. More recently they note that she had no dizziness the 4 days she was off furosemide recently. Holter did not reveal significant arrhythmias but they have not met with Dr. Lomeli to review the results. May need to consider LM coronary artery compression by dilated PA but this seems less likely. May need to consider chest pain was more related to esophageal disease and constipation noted on CT. Both her constipation and chest pain have resolved. Home sleep study ruled out DEION.  If she notes progression of her dizziness, we have advised her  to bring to ED and plan for transfer to [facility] (or come straight to [facility] if feasible) for admission to further assess for LM compression, PDA shunting, and progression of PAH with RHC.   Plan: - Continue tadalafil 20 mg daily - continue 20 mg Lasix daily and increase to 30 mg daily for a few days if weight exceeds 121.5 lbs - continue Opsumit - Continue sotatercept. Will receive next target dose 4/2 (Winrevair #3)  (had initial dose 2/19) - Repeat CBC on 4/16 prior to 3rd dose 4/23 - F/U holter monitor readings with Dr. Dionisio Lomeli - use 2L O2 as needed to maintain SpO2 >88%. Pt is ambulatory in the home and community and benefits from portable oxygen - Will need to consider imaging of her congenital heart disease w/ cMRI (coil in chest is her PDA coil, MRI compatible) - Counseled she does not have to hold her breath for a full 10 seconds after each Advair inhalation if it is causing discomfort. Try a count of 5 instead. If this continues to be an issue we can consider switching to nebulized solutions instead of inhaler  RTC in April, video visit for fourth sotatercept injection 4/17 1:00 (lab 4/16)

## 2025-03-27 NOTE — HISTORY OF PRESENT ILLNESS
[TextBox_4] : 73 year old F with asthma (Flovent Diskus) on cognitive disability and hearing loss here for evaluation pulmonary hypertension after seeing Dr. Janel Lomeli for cyanosis and shortness of breath. She is accompanied by her  who provides most of the history. In 2022 she had COVID pneumonia and was hospitalized. She did not require invasive ventilation and was monitored for a few days. Since then she has had significant neurologic decline most notable for short term memory loss. She also has a longstanding history of asthma since adulthood. She does not recall how she was diagnosed or if she's ever had PFTs, on flovent. In 4/2024 she was hospitalized at [facility] for pneumonia and discharged home on O2. Since discharge she has felt more fatigued, and with increased dyspnea on minimal exertion. She also notes that this was ongoing prior to her hospitalization but the pneumonia worsened it. She has a history of PDA requiring surgical closure and had CHF related to pregnancy many years ago. At our initial visit she had cyanosis of hands making SpO2 difficult, noted SOB w/ 100 ft. Underwent RHC which demonstrated severe precap PH (mPA 41, PAWP 13, CO/CI 3/1.9, PVR 9). Given severity of PVR it was felt this was vascular phenotype despite group II and III disease, she was placed on dual therapy w/ macitentan/tadalafil   PH Regimen tadalafil 20 mg daily [started October 2024, held Dec 18 2024, restart 1/2025] macitentan (Opsumit) 10 mg [started October 2024] sotatercept [started 2/19/25]  -CBC #1 12/30 Hgb 13.2 [id number] -CBC #2 3/5. Hgb 14.6, Plt 120 pro  -CBC #3 3/26 Hgb 14.4 Plt 105 pro   3-27-25 Received first sotatercept target dose 3/19 (had initial dose 2/19) Repeat CBC on 3/26 prior to 3rd dose 4/2. Hgb yesterday was 14.4, Plt 105. Her breathing is noticeably better. She gets tired but not out of breath. Walked a nice long city block on [place] 3 yesterday and was not wiped out or have to stop. She can walk at a comfortable pace and is not gasping for air. Yesterday had a little bit of dizziness and vomiting but overall things have been better.  Seems to be in the morning and brief. Has a "set back" each morning after doing Advair. Feels depleted after holding her breath for a count of 10 after each administration and has to lie down.  Has not noticed any bleeding or bruising since starting sotaterept. No telangiectasias. No epistaxis. Has been eating out lately.  Weight has been 119-120. Taking 20 mg Lasix daily Has not been using rolling walker that they purchased because her walking is off balance

## 2025-04-03 NOTE — PROCEDURE
[FreeTextEntry1] : CBC #3 3/26 Hgb 14.4 PLt 105 ***** CBC #2 3/5. Hgb 14.6, Plt 120 ***** 2/13/25 Home Sleep Study AHI 2 with saba SpO2 87% No evidence of sleep apnea ***** 1/15/25 LABS CBC canceled (specimen not labeled) Creatinine 0.84 LFTs WNL Pro  **** 12/18/24 ECHO at [place] 1. Left ventricular cavity is normal in size. Left ventricular systolic function is normal with an ejection fraction of 77 % by Jacobo's method of disks. 2. There is mild (grade 1) left ventricular diastolic dysfunction. 3. There is increased LV mass and concentric hypertrophy. 4. Normal right ventricular cavity size and normal right ventricular systolic function. 5. Left atrium is severely dilated. 6. The right atrium is mildly dilated. 7. Aortic root at the sinuses of Valsalva is dilated, measuring 4.20 cm (indexed 2.70 cm/m). Ascending aorta is dilated, measuring 4.40 cm (indexed 2.83 cm/m). 8. The main pulmonary artery is dilated at 4.6 cm. There appears to be a PDA. 9. Mild aortic regurgitation. 10. Moderate pulmonic regurgitation. 11. Mild mitral regurgitation. 12. Pulmonary artery systolic pressure could not be estimated. 13. No pericardial effusion seen. 14. Compared to the transthoracic echocardiogram performed on 7/3/2024, main PA is more dilated, there appears to be a PDA. ***** 12/18/24 CTA CHEST at [place] 1. No pulmonary embolism. Dilated main pulmonary artery suggestive of pulmonary hypertension.    2. Scattered areas of small area and large airway disease. Numerous lung nodules, probably  infectious/inflammatory in nature. 13 mm nodule at the basilar right lower lobe. Recommend follow-up CT chest in 6-12 months to ensure stability.    3. Aneurysmal thoracic aorta measuring up to 4.4 cm at the ascending aorta. Continued surveillance  is recommended.    4. Partial opacification of the hepatic veins suggestive of right heart failure.    5. Outpouching at the left side of the upper esophagus, probably a [place] Nilam diverticulum.     ***** 9/9/24 RHC Baseline /87/113, SpO2 89% on room air, SVC 57%, RA 65% PA 62% RA 10 RV 65/12 PA 69/34/41 PAWP 13 TP 28, DPG 21, PVR 9.3 LONG CO/CI (td) 3.0/1/9, SV 51, Svi 21 SVR 2746 dynes  PVR/SVR 0.27 Brennon 3.4, RVSWI 13.5  Val HR 51 /84/114, SpO2 100% PA 60/27/34 PAWP 15 CO/CI 73/1.75, SV 53, Svi 34 TPG 19, DPG 12  PVR 6.9  Severe precapillary disease, while she is at risk for WHO Group II and III PH, her hemodynamics are most consistent with  group I phenotype. Discussed with patient and  at bedside post procedure re: need for vasodilator therapy. Will plan for tadalafil + Opsumit for ease of polypharmacy, likely would benefit from full triple therapy + sotatercept, may be limited due to medication side effects, adherence and comorbidities. To follow up in clinic tomorrow to discuss further.  ***** 8/27/24 Pro   Na 146 (H) A1c 5.5% HIV, Hepatitis B and C non reactive Centromere Ab 2.7 (H) ESR, Rheumatoid factor, TSH, Scleroderma, ds DNA, Sjogren's SHERLY  WNL ***** 7/30/24 CT CHEST 1. Nonspecific bilateral pulmonary nodules measuring up to 1.3 cm in the right lower lobe. Recommend follow-up chest CT in 3 months to determine stability.  2. Severely dilated pulmonary artery representing pulmonary hypertension. Mosaic attenuation of the lungs likely secondary to pulmonary hypertension. **** 7/22/24 PFT FVC 1.65 (63) -> 1.85 (71) FEV1 0.77 (38) -> 0.78 (39) FEV1/FVC 59 -> 53 TLC 3.07 (62) DLCO 7.58 (41) There is moderate obstruction w/o sig BD response. Moderate restriction. No Air trapping. DLCO severely reduced *** 7/22/24 6MWT 183 meters Resting SpO2 92% on room air. Desaturation to 88% requiring supplemental O2 at 5L to maintain SpO2 > 88%. Mild Dyspnea (Mir 1) and fatigue (Mir 2.5). No rests taken. **** 7/3/24 ECHO 1. Left ventricular cavity is normal in size. Left ventricular systolic function is normal with an ejection fraction of 69 % by Jacobo's method of disks.  2. There is mild (grade 1) left ventricular diastolic dysfunction.  3. Mild left ventricular hypertrophy.  4. Normal right ventricular cavity size and normal right ventricular systolic function.  5. The left atrium is mildly dilated.  6. The right atrium is mildly dilated.  7. Aortic root at the sinuses of Valsalva is dilated, measuring 4.20 cm (indexed 2.74 cm/m). Ascending aorta diameter is dilated, measuring 4.50 cm (indexed 2.93 cm/m).  8. Mild aortic regurgitation.  9. Mild to moderate pulmonic regurgitation.  10. Dilated pulmonary artery. Main PA measures 4.0 cm. 1 1. Mild mitral regurgitation.  12. Mild tricuspid regurgitation.  13. Estimated pulmonary artery systolic pressure is 66 mmHg, consistent with severe pulmonary hypertension.  14. No pericardial effusion seen. 15. Compared to the transthoracic echocardiogram performed on 8/7/2018, ascending aorta is dilated, severe PHTN is present.

## 2025-04-03 NOTE — ASSESSMENT
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 At risk for multifactorial PH: WHO group I (congenital s/p PDA closure) vs WHO group II (diastolic dysfunction, enlarged LA) vs WHO Group III (combined restriction/obstructive pattern, CT chest with faint centrilobular GGO and mosaicism, dilated PA 5.4cm, and dilated esophagus with debris). 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 PFTs with obstruction and restriction + severely reduced DLCO  (FEV1/FVC 59%, FEV1 39%, FVC 63%, TLC 62%, DLCO 41%, FVC/DCLO 1.5). 6MWT 183m severely reduced walk distance. RHC done 9/9/24 - severe precapillary disease with low CO/CI. Shunt run suggestive of bidirectional shunt, possible opening of PFO but this is likely protective due to severe PH (SVC to PA increase of 5%, Ao sat 89%)  REVEAL RISK: INT (if considered idiopathic)   Started on dual oral therapy due to concern of more complicated/titrated medications with her significant cognitive decline and difficulty with medications. Initially noted definite improvement although some episodic sudden RIVAS for which may have been more related to airway disease and stepped up Advair therapy. Now her breathing symptoms are improved but she is having frequent dizzy spells. Holter monitor was placed on 12/16/24, she has had multiple episodes since then and 3 ER visits. Given worsening dizziness without worsening RV size/function and only mild increase in NTproBNP, decision made to temporarily hold tadalafil but it has since been resumed as there was not much difference off it. Her multifactorial PH with group I, II, and III RF may make her intolerant to dual oral therapy vs. her dizziness could be a result of worsening PH vs it may be vertigo but it is difficult to tell.   Ultimately may need further work up for PDA including cMRI and/or repeat RHC. Per husbands report, she has metal coil so difficult to obtain cardiac MRI, can do alternative imaging like cardiac CT and adult congenital heart disease specialist referral. Initially thought to be better regarding dizziness off tadalafil but even off it had episodes. More recently they note that she had no dizziness the 4 days she was off furosemide recently. Holter did not reveal significant arrhythmias but they have not met with Dr. Lomeli to review the results. May need to consider LM coronary artery compression by dilated PA but this seems less likely. May need to consider chest pain was more related to esophageal disease and constipation noted on CT. Both her constipation and chest pain have resolved. Home sleep study ruled out DEION.  If she notes progression of her dizziness, we have advised her  to bring to ED and plan for transfer to [facility] (or come straight to [facility] if feasible) for admission to further assess for LM compression, PDA shunting, and progression of PAH with RHC.   Plan: - Continue tadalafil 20 mg daily - continue 20 mg Lasix daily and increase to 30 mg daily for a few days if weight exceeds 121.5 lbs - continue Opsumit - Continue sotatercept. Will receive next target dose 4/2 (Winrevair #3)  (had initial dose 2/19) - Repeat CBC on 4/16 prior to 3rd dose 4/23 - F/U holter monitor readings with Dr. Dionisio Lomeli - use 2L O2 as needed to maintain SpO2 >88%. Pt is ambulatory in the home and community and benefits from portable oxygen - Will need to consider imaging of her congenital heart disease w/ cMRI (coil in chest is her PDA coil, MRI compatible) - Counseled she does not have to hold her breath for a full 10 seconds after each Advair inhalation if it is causing discomfort. Try a count of 5 instead. If this continues to be an issue we can consider switching to nebulized solutions instead of inhaler  RTC in April, video visit for fourth sotatercept injection 4/17 1:00 (lab 4/16)

## 2025-04-03 NOTE — HISTORY OF PRESENT ILLNESS
[TextBox_4] : 73 year old F with asthma (Flovent Diskus) on cognitive disability and hearing loss here for evaluation pulmonary hypertension after seeing Dr. Janel Lomeli for cyanosis and shortness of breath. She is accompanied by her  who provides most of the history. In 2022 she had COVID pneumonia and was hospitalized. She did not require invasive ventilation and was monitored for a few days. Since then she has had significant neurologic decline most notable for short term memory loss. She also has a longstanding history of asthma since adulthood. She does not recall how she was diagnosed or if she's ever had PFTs, on flovent. In 4/2024 she was hospitalized at [facility] for pneumonia and discharged home on O2. Since discharge she has felt more fatigued, and with increased dyspnea on minimal exertion. She also notes that this was ongoing prior to her hospitalization but the pneumonia worsened it. She has a history of PDA requiring surgical closure and had CHF related to pregnancy many years ago. At our initial visit she had cyanosis of hands making SpO2 difficult, noted SOB w/ 100 ft. Underwent RHC which demonstrated severe precap PH (mPA 41, PAWP 13, CO/CI 3/1.9, PVR 9). Given severity of PVR it was felt this was vascular phenotype despite group II and III disease, she was placed on dual therapy w/ macitentan/tadalafil   PH Regimen tadalafil 20 mg daily [started October 2024, held Dec 18 2024, restart 1/2025] macitentan (Opsumit) 10 mg [started October 2024] sotatercept [started 2/19/25]  -CBC #1 12/30 Hgb 13.2 [id number] -CBC #2 3/5. Hgb 14.6, Plt 120 pro  -CBC #3 3/26 Hgb 14.4 Plt 105 pro   4-8-25 Had Winrevair #3 on 4/2, dose reduced to 0.3 ml of 45 mg vial due to recent dizziness that worsened.  reports the dizziness happens most mornings after breakfast. They were counseled last week about ways to mediate postprandial hypotension.   3-27-25 Received first sotatercept target dose 3/19 (had initial dose 2/19) Repeat CBC on 3/26 prior to 3rd dose 4/2. Hgb yesterday was 14.4, Plt 105. Her breathing is noticeably better. She gets tired but not out of breath. Walked a nice long city block on [place] 3 yesterday and was not wiped out or have to stop. She can walk at a comfortable pace and is not gasping for air. Yesterday had a little bit of dizziness and vomiting but overall things have been better.  Seems to be in the morning and brief. Has a "set back" each morning after doing Advair. Feels depleted after holding her breath for a count of 10 after each administration and has to lie down.  Has not noticed any bleeding or bruising since starting sotaterept. No telangiectasias. No epistaxis. Has been eating out lately.  Weight has been 119-120. Taking 20 mg Lasix daily Has not been using rolling walker that they purchased because her walking is off balance

## 2025-04-03 NOTE — PROCEDURE
[FreeTextEntry1] : CBC #3 3/26 Hgb 14.4 PLt 105 ***** CBC #2 3/5. Hgb 14.6, Plt 120 ***** 2/13/25 Home Sleep Study AHI 2 with saba SpO2 87% No evidence of sleep apnea ***** 1/15/25 LABS CBC canceled (specimen not labeled) Creatinine 0.84 LFTs WNL Pro  **** 12/18/24 ECHO at [place] 1. Left ventricular cavity is normal in size. Left ventricular systolic function is normal with an ejection fraction of 77 % by Jacobo's method of disks. 2. There is mild (grade 1) left ventricular diastolic dysfunction. 3. There is increased LV mass and concentric hypertrophy. 4. Normal right ventricular cavity size and normal right ventricular systolic function. 5. Left atrium is severely dilated. 6. The right atrium is mildly dilated. 7. Aortic root at the sinuses of Valsalva is dilated, measuring 4.20 cm (indexed 2.70 cm/m). Ascending aorta is dilated, measuring 4.40 cm (indexed 2.83 cm/m). 8. The main pulmonary artery is dilated at 4.6 cm. There appears to be a PDA. 9. Mild aortic regurgitation. 10. Moderate pulmonic regurgitation. 11. Mild mitral regurgitation. 12. Pulmonary artery systolic pressure could not be estimated. 13. No pericardial effusion seen. 14. Compared to the transthoracic echocardiogram performed on 7/3/2024, main PA is more dilated, there appears to be a PDA. ***** 12/18/24 CTA CHEST at [place] 1. No pulmonary embolism. Dilated main pulmonary artery suggestive of pulmonary hypertension.    2. Scattered areas of small area and large airway disease. Numerous lung nodules, probably  infectious/inflammatory in nature. 13 mm nodule at the basilar right lower lobe. Recommend follow-up CT chest in 6-12 months to ensure stability.    3. Aneurysmal thoracic aorta measuring up to 4.4 cm at the ascending aorta. Continued surveillance  is recommended.    4. Partial opacification of the hepatic veins suggestive of right heart failure.    5. 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     ***** 9/9/24 RHC Baseline /87/113, SpO2 89% on room air, SVC 57%, RA 65% PA 62% RA 10 RV 65/12 PA 69/34/41 PAWP 13 TP 28, DPG 21, PVR 9.3 LONG CO/CI (td) 3.0/1/9, SV 51, Svi 21 SVR 2746 dynes  PVR/SVR 0.27 Brennon 3.4, RVSWI 13.5  Val HR 51 /84/114, SpO2 100% PA 60/27/34 PAWP 15 CO/CI 73/1.75, SV 53, Svi 34 TPG 19, DPG 12  PVR 6.9  Severe precapillary disease, while she is at risk for WHO Group II and III PH, her hemodynamics are most consistent with  group I phenotype. Discussed with patient and  at bedside post procedure re: need for vasodilator therapy. Will plan for tadalafil + Opsumit for ease of polypharmacy, likely would benefit from full triple therapy + sotatercept, may be limited due to medication side effects, adherence and comorbidities. To follow up in clinic tomorrow to discuss further.  ***** 8/27/24 Pro   Na 146 (H) A1c 5.5% HIV, Hepatitis B and C non reactive Centromere Ab 2.7 (H) ESR, Rheumatoid factor, TSH, Scleroderma, ds DNA, Sjogren's SHERLY  WNL ***** 7/30/24 CT CHEST 1. Nonspecific bilateral pulmonary nodules measuring up to 1.3 cm in the right lower lobe. Recommend follow-up chest CT in 3 months to determine stability.  2. Severely dilated pulmonary artery representing pulmonary hypertension. Mosaic attenuation of the lungs likely secondary to pulmonary hypertension. **** 7/22/24 PFT FVC 1.65 (63) -> 1.85 (71) FEV1 0.77 (38) -> 0.78 (39) FEV1/FVC 59 -> 53 TLC 3.07 (62) DLCO 7.58 (41) There is moderate obstruction w/o sig BD response. Moderate restriction. No Air trapping. DLCO severely reduced *** 7/22/24 6MWT 183 meters Resting SpO2 92% on room air. Desaturation to 88% requiring supplemental O2 at 5L to maintain SpO2 > 88%. Mild Dyspnea (Mir 1) and fatigue (Mir 2.5). No rests taken. **** 7/3/24 ECHO 1. Left ventricular cavity is normal in size. Left ventricular systolic function is normal with an ejection fraction of 69 % by Jacobo's method of disks.  2. There is mild (grade 1) left ventricular diastolic dysfunction.  3. Mild left ventricular hypertrophy.  4. Normal right ventricular cavity size and normal right ventricular systolic function.  5. The left atrium is mildly dilated.  6. The right atrium is mildly dilated.  7. Aortic root at the sinuses of Valsalva is dilated, measuring 4.20 cm (indexed 2.74 cm/m). Ascending aorta diameter is dilated, measuring 4.50 cm (indexed 2.93 cm/m).  8. Mild aortic regurgitation.  9. Mild to moderate pulmonic regurgitation.  10. Dilated pulmonary artery. Main PA measures 4.0 cm. 1 1. Mild mitral regurgitation.  12. Mild tricuspid regurgitation.  13. Estimated pulmonary artery systolic pressure is 66 mmHg, consistent with severe pulmonary hypertension.  14. No pericardial effusion seen. 15. Compared to the transthoracic echocardiogram performed on 8/7/2018, ascending aorta is dilated, severe PHTN is present.

## 2025-04-03 NOTE — ASSESSMENT
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 At risk for multifactorial PH: WHO group I (congenital s/p PDA closure) vs WHO group II (diastolic dysfunction, enlarged LA) vs WHO Group III (combined restriction/obstructive pattern, CT chest with faint centrilobular GGO and mosaicism, dilated PA 5.4cm, and dilated esophagus with debris). 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 PFTs with obstruction and restriction + severely reduced DLCO  (FEV1/FVC 59%, FEV1 39%, FVC 63%, TLC 62%, DLCO 41%, FVC/DCLO 1.5). 6MWT 183m severely reduced walk distance. RHC done 9/9/24 - severe precapillary disease with low CO/CI. Shunt run suggestive of bidirectional shunt, possible opening of PFO but this is likely protective due to severe PH (SVC to PA increase of 5%, Ao sat 89%)  REVEAL RISK: INT (if considered idiopathic)   Started on dual oral therapy due to concern of more complicated/titrated medications with her significant cognitive decline and difficulty with medications. Initially noted definite improvement although some episodic sudden RIVAS for which may have been more related to airway disease and stepped up Advair therapy. Now her breathing symptoms are improved but she is having frequent dizzy spells. Holter monitor was placed on 12/16/24, she has had multiple episodes since then and 3 ER visits. Given worsening dizziness without worsening RV size/function and only mild increase in NTproBNP, decision made to temporarily hold tadalafil but it has since been resumed as there was not much difference off it. Her multifactorial PH with group I, II, and III RF may make her intolerant to dual oral therapy vs. her dizziness could be a result of worsening PH vs it may be vertigo but it is difficult to tell.   Ultimately may need further work up for PDA including cMRI and/or repeat RHC. Per husbands report, she has metal coil so difficult to obtain cardiac MRI, can do alternative imaging like cardiac CT and adult congenital heart disease specialist referral. Initially thought to be better regarding dizziness off tadalafil but even off it had episodes. More recently they note that she had no dizziness the 4 days she was off furosemide recently. Holter did not reveal significant arrhythmias but they have not met with Dr. Lomeli to review the results. May need to consider LM coronary artery compression by dilated PA but this seems less likely. May need to consider chest pain was more related to esophageal disease and constipation noted on CT. Both her constipation and chest pain have resolved. Home sleep study ruled out DEION.  If she notes progression of her dizziness, we have advised her  to bring to ED and plan for transfer to [facility] (or come straight to [facility] if feasible) for admission to further assess for LM compression, PDA shunting, and progression of PAH with RHC.   Plan: - Continue tadalafil 20 mg daily - continue 20 mg Lasix daily and increase to 30 mg daily for a few days if weight exceeds 121.5 lbs - continue Opsumit - Continue sotatercept. Will receive next target dose 4/2 (Winrevair #3)  (had initial dose 2/19) - Repeat CBC on 4/16 prior to 3rd dose 4/23 - F/U holter monitor readings with Dr. Dionisio Lomeli - use 2L O2 as needed to maintain SpO2 >88%. Pt is ambulatory in the home and community and benefits from portable oxygen - Will need to consider imaging of her congenital heart disease w/ cMRI (coil in chest is her PDA coil, MRI compatible) - Counseled she does not have to hold her breath for a full 10 seconds after each Advair inhalation if it is causing discomfort. Try a count of 5 instead. If this continues to be an issue we can consider switching to nebulized solutions instead of inhaler  RTC in April, video visit for fourth sotatercept injection 4/17 1:00 (lab 4/16)

## 2025-04-03 NOTE — HISTORY OF PRESENT ILLNESS
[TextBox_4] : 73 year old F with asthma (Flovent Diskus) on cognitive disability and hearing loss here for evaluation pulmonary hypertension after seeing Dr. Janel Lomeli for cyanosis and shortness of breath. She is accompanied by her  who provides most of the history. In 2022 she had COVID pneumonia and was hospitalized. She did not require invasive ventilation and was monitored for a few days. Since then she has had significant neurologic decline most notable for short term memory loss. She also has a longstanding history of asthma since adulthood. She does not recall how she was diagnosed or if she's ever had PFTs, on flovent. In 4/2024 she was hospitalized at [facility] for pneumonia and discharged home on O2. Since discharge she has felt more fatigued, and with increased dyspnea on minimal exertion. She also notes that this was ongoing prior to her hospitalization but the pneumonia worsened it. She has a history of PDA requiring surgical closure and had CHF related to pregnancy many years ago. At our initial visit she had cyanosis of hands making SpO2 difficult, noted SOB w/ 100 ft. Underwent RHC which demonstrated severe precap PH (mPA 41, PAWP 13, CO/CI 3/1.9, PVR 9). Given severity of PVR it was felt this was vascular phenotype despite group II and III disease, she was placed on dual therapy w/ macitentan/tadalafil   PH Regimen tadalafil 20 mg daily [started October 2024, held Dec 18 2024, restart 1/2025] macitentan (Opsumit) 10 mg [started October 2024] sotatercept [started 2/19/25]  -CBC #1 12/30 Hgb 13.2 [id number] -CBC #2 3/5. Hgb 14.6, Plt 120 pro  -CBC #3 3/26 Hgb 14.4 Plt 105 pro   4-8-25 Had Winrevair #3 on 4/2, dose reduced to 0.3 ml of 45 mg vial due to recent dizziness that worsened.  reports the dizziness happens most mornings after breakfast. They were counseled last week about ways to mediate postprandial hypotension.   3-27-25 Received first sotatercept target dose 3/19 (had initial dose 2/19) Repeat CBC on 3/26 prior to 3rd dose 4/2. Hgb yesterday was 14.4, Plt 105. Her breathing is noticeably better. She gets tired but not out of breath. Walked [place] nice long city block [place] yesterday and was not wiped out or have to stop. She can walk at a comfortable pace and is not gasping for air. Yesterday had a little bit of dizziness and vomiting but overall things have been better.  Seems to be in the morning and brief. Has a "set back" each morning after doing Advair. Feels depleted after holding her breath for a count of 10 after each administration and has to lie down.  Has not noticed any bleeding or bruising since starting sotaterept. No telangiectasias. No epistaxis. Has been eating out lately.  Weight has been 119-120. Taking 20 mg Lasix daily Has not been using rolling walker that they purchased because her walking is off balance

## 2025-04-17 NOTE — ASSESSMENT
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 At risk for multifactorial PH: WHO group I (congenital s/p PDA closure) vs WHO group II (diastolic dysfunction, enlarged LA) vs WHO Group III (combined restriction/obstructive pattern, CT chest with faint centrilobular GGO and mosaicism, dilated PA 5.4cm, and dilated esophagus with debris). 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 PFTs with obstruction and restriction + severely reduced DLCO  (FEV1/FVC 59%, FEV1 39%, FVC 63%, TLC 62%, DLCO 41%, FVC/DCLO 1.5). 6MWT 183m severely reduced walk distance. RHC done 9/9/24 - severe precapillary disease with low CO/CI. Shunt run suggestive of bidirectional shunt, possible opening of PFO but this is likely protective due to severe PH (SVC to PA increase of 5%, Ao sat 89%)  REVEAL RISK: INT (if considered idiopathic)   Started on dual oral therapy due to concern of more complicated/titrated medications with her significant cognitive decline and difficulty with medications. Initially noted definite improvement although some episodic sudden RIVAS for which may have been more related to airway disease and stepped up Advair therapy. Now her breathing symptoms are improved but she is having frequent dizzy spells. Holter monitor was placed on 12/16/24, she has had multiple episodes since then and 3 ER visits. Given worsening dizziness without worsening RV size/function and only mild increase in NTproBNP, decision made to temporarily hold tadalafil but it has since been resumed as there was not much difference off it. Her multifactorial PH with group I, II, and III RF may make her intolerant to dual oral therapy vs. her dizziness could be a result of worsening PH vs it may be vertigo but it is difficult to tell.   Constant attempts to adjust medications have not altered the dizziness, need to consider it is unrelated to PAH or it's treatment.   Plan: - Continue tadalafil to 40 mg daily - return back to 20mg daily if there is clear worsening of dizziness - continue 20 mg Lasix daily and increase to 30 mg daily for a few days if weight exceeds 121.5 lbs - continue Opsumit - Continue sotatercept. Will receive next dose (target dose 0.7 ml of 45 mg vial) 4/23 (Winrevair #4)  (had initial dose 2/19).  - Repeat CBC on 5/7 prior to 5th dose 5/14 - use 2L O2 as needed to maintain SpO2 >88%. Pt is ambulatory in the home and community and benefits from portable oxygen - Will need to consider imaging of her congenital heart disease w/ cMRI (coil in chest is her PDA coil, MRI compatible) - Counseled about small, non carbohydrate heavy meals, especially breakfast to avoid postprandial hypotension. Also recommended compression stockings in case there is an element of orthostasis. - follow up with ENT regarding off balance and dizziness. If not explained would consult neuro - PFT and 6MWT before June visit - echocardiogram in May  RTC in June, video visit for fifth sotatercept injection 5/8 1:00 (lab 5/7)

## 2025-04-17 NOTE — HISTORY OF PRESENT ILLNESS
[TextBox_4] : 73 year old F with asthma (Flovent Diskus) on cognitive disability and hearing loss here for evaluation pulmonary hypertension after seeing Dr. Janel Lomeli for cyanosis and shortness of breath. She is accompanied by her  who provides most of the history. In 2022 she had COVID pneumonia and was hospitalized. She did not require invasive ventilation and was monitored for a few days. Since then she has had significant neurologic decline most notable for short term memory loss. She also has a longstanding history of asthma since adulthood. She does not recall how she was diagnosed or if she's ever had PFTs, on flovent. In 4/2024 she was hospitalized at [facility] for pneumonia and discharged home on O2. Since discharge she has felt more fatigued, and with increased dyspnea on minimal exertion. She also notes that this was ongoing prior to her hospitalization but the pneumonia worsened it. She has a history of PDA requiring surgical closure and had CHF related to pregnancy many years ago. At our initial visit she had cyanosis of hands making SpO2 difficult, noted SOB w/ 100 ft. Underwent RHC which demonstrated severe precap PH (mPA 41, PAWP 13, CO/CI 3/1.9, PVR 9). Given severity of PVR it was felt this was vascular phenotype despite group II and III disease, she was placed on dual therapy w/ macitentan/tadalafil   PH Regimen tadalafil 20 mg daily [started October 2024, held Dec 18 2024, restart 1/2025] macitentan (Opsumit) 10 mg [started October 2024] sotatercept [started 2/19/25]  -CBC #1 12/30 Hgb 13.2 [id number] -CBC #2 3/5. Hgb 14.6, Plt 120 pro  -CBC #3 3/26 Hgb 14.4 Plt 105 pro  -CBC #4 4/16 Hgb 13.9 Plt 121 pro   4-17-25 Pt has been very tired, exhausted. Today is better. Increased tadalafil to 40 mg daily on 4/8. No impact on the dizzy spells. Mostly in the morning into the early afternoon. Has more energy in the afternoon. They ordered compression stockings from [facility] and should be arriving soon. Wearing O2 on 2-3 liters. Her weight today 118. BP on Tuesday was 143/86 when she was very fatigued, then 127/95 in the same position. Hoping to get an ENT appointment next week, on a wait list. Did not make a neuro appointment yet because the offices they have called have long waits. Her uterine prolapse has gotten worse and is very painful, has a Uro gyn appointment 4/29.  Eats Chinese food a lot, not aware of the high sodium content. Do not add salt to their foods.   4-8-25 Had Winrevair #3 on 4/2, dose reduced to 0.3 ml of 45 mg vial due to recent dizziness that worsened.  reports the dizziness happens most mornings after breakfast. They were counseled last week about ways to mediate postprandial hypotension.  Pt says today "everything is good". Her breathing has been OK lately.  says there is a noticeable improvement. Easier to take a breath. Does not notice getting out of breath anymore, not asking for her rescue inhaler.  In the morning she walks holding onto the walls for balance. This is separate from "dizzy spells". Feels lightheaded as opposed to off balance at other times. Can last up to 20 minutes this also happens in the morning. Switched inhaler to before breakfast and no longer had dizziness. Would also happen at night when she got up to go to the toilet and would stand at the sink for a long time to wash her hands. Now that she uses hand wipes this is not an issue. She complains of being chilly frequently throughout the day. Her hands are cold too. She takes 1 tablet of tadalafil at night. Pt is a great , was supposed to do a 20-30 minute speech recently but they had to cancel due to dizziness. Her BP was thought to be low at the time but not measured.   Has not noticed any bleeding or bruising since starting sotatercept. No telangiectasias. No epistaxis. She has a red dot on the tip of her nose that appeared recently and has not gone away. Weight has been around 121. Taking 20 mg Lasix daily She has not seen a neurologist recently. ENT attributed her off balance issues to not wearing her hearing aids (but she has been using the hearing aids). PCP was not interested in sending her for vestibular rehab.

## 2025-04-17 NOTE — HISTORY OF PRESENT ILLNESS
[TextBox_4] : 73 year old F with asthma (Flovent Diskus) on cognitive disability and hearing loss here for evaluation pulmonary hypertension after seeing Dr. Janel Lomeli for cyanosis and shortness of breath. She is accompanied by her  who provides most of the history. In 2022 she had COVID pneumonia and was hospitalized. She did not require invasive ventilation and was monitored for a few days. Since then she has had significant neurologic decline most notable for short term memory loss. She also has a longstanding history of asthma since adulthood. She does not recall how she was diagnosed or if she's ever had PFTs, on flovent. In 4/2024 she was hospitalized at [facility] for pneumonia and discharged home on O2. Since discharge she has felt more fatigued, and with increased dyspnea on minimal exertion. She also notes that this was ongoing prior to her hospitalization but the pneumonia worsened it. She has a history of PDA requiring surgical closure and had CHF related to pregnancy many years ago. At our initial visit she had cyanosis of hands making SpO2 difficult, noted SOB w/ 100 ft. Underwent RHC which demonstrated severe precap PH (mPA 41, PAWP 13, CO/CI 3/1.9, PVR 9). Given severity of PVR it was felt this was vascular phenotype despite group II and III disease, she was placed on dual therapy w/ macitentan/tadalafil   PH Regimen tadalafil 20 mg daily [started October 2024, held Dec 18 2024, restart 1/2025] macitentan (Opsumit) 10 mg [started October 2024] sotatercept [started 2/19/25]  -CBC #1 12/30 Hgb 13.2 [id number] -CBC #2 3/5. Hgb 14.6, Plt 120 pro  -CBC #3 3/26 Hgb 14.4 Plt 105 pro  -CBC #4 4/16 Hgb 13.9 Plt 121 pro   4-17-25 Pt has been very tired, exhausted. Today is better. Increased tadalafil to 40 mg daily on 4/8. No impact on the dizzy spells. Mostly in the morning into the early afternoon. Has more energy in the afternoon. They ordered compression stockings from [facility] and should be arriving soon. Wearing O2 on 2-3 liters. Her weight today 118. BP on Tuesday was 143/86 when she was very fatigued, then 127/95 in the same position. Hoping to get an ENT appointment next week, on a wait list. Did not make a neuro appointment yet because the offices they have called have long waits. Her uterine prolapse has gotten worse and is very painful, has a Uro gyn appointment 4/29.  Eats Chinese food a lot, not aware of the high sodium content. Do not add salt to their foods.   4-8-25 Had Winrevair #3 on 4/2, dose reduced to 0.3 ml of 45 mg vial due to recent dizziness that worsened.  reports the dizziness happens most mornings after breakfast. They were counseled last week about ways to mediate postprandial hypotension.  Pt says today "everything is good". Her breathing has been OK lately.  says there is a noticeable improvement. Easier to take a breath. Does not notice getting out of breath anymore, not asking for her rescue inhaler.  In the morning she walks holding onto the walls for balance. This is separate from "dizzy spells". Feels lightheaded as opposed to off balance at other times. Can last up to 20 minutes this also happens in the morning. Switched inhaler to before breakfast and no longer had dizziness. Would also happen at night when she got up to go to the toilet and would stand at the sink for a long time to wash her hands. Now that she uses hand wipes this is not an issue. She complains of being chilly frequently throughout the day. Her hands are cold too. She takes 1 tablet of tadalafil at night. Pt is a great , was supposed to do a 20-30 minute speech recently but they had to cancel due to dizziness. Her BP was thought to be low at the time but not measured.   Has not noticed any bleeding or bruising since starting sotatercept. No telangiectasias. No epistaxis. She has a red dot on the tip of her nose that appeared recently and has not gone away. Weight has been around 121. Taking 20 mg Lasix daily She has not seen a neurologist recently. ENT attributed her off balance issues to not wearing her hearing aids (but she has been using the hearing aids). PCP was not interested in sending her for vestibular rehab.

## 2025-04-17 NOTE — REASON FOR VISIT
[Spouse] : spouse [Home] : at home, [unfilled] , at the time of the visit. [Medical Office: ([facility])___] : at the medical office located in  [Telehealth (audio & video)] : This visit was provided via telehealth using real-time 2-way audio visual technology. [Verbal consent obtained from patient] : the patient, [unfilled] [Follow-Up] : a follow-up visit [Pulmonary Hypertension] : pulmonary hypertension [Shortness of Breath] : shortness of breath [FreeTextEntry4] : Yessenia Nicolas

## 2025-04-17 NOTE — PROCEDURE
[FreeTextEntry1] : CBC #3 3/26 Hgb 14.4 PLt 105 ***** CBC #2 3/5. Hgb 14.6, Plt 120 ***** 2/13/25 Home Sleep Study AHI 2 with saba SpO2 87% No evidence of sleep apnea ***** 1/15/25 LABS CBC canceled (specimen not labeled) Creatinine 0.84 LFTs WNL Pro  **** 12/18/24 ECHO at [place] 1. Left ventricular cavity is normal in size. Left ventricular systolic function is normal with an ejection fraction of 77 % by Jacobo's method of disks. 2. There is mild (grade 1) left ventricular diastolic dysfunction. 3. There is increased LV mass and concentric hypertrophy. 4. Normal right ventricular cavity size and normal right ventricular systolic function. 5. Left atrium is severely dilated. 6. The right atrium is mildly dilated. 7. Aortic root at the sinuses of Valsalva is dilated, measuring 4.20 cm (indexed 2.70 cm/m). Ascending aorta is dilated, measuring 4.40 cm (indexed 2.83 cm/m). 8. The main pulmonary artery is dilated at 4.6 cm. There appears to be a PDA. 9. Mild aortic regurgitation. 10. Moderate pulmonic regurgitation. 11. Mild mitral regurgitation. 12. Pulmonary artery systolic pressure could not be estimated. 13. No pericardial effusion seen. 14. Compared to the transthoracic echocardiogram performed on 7/3/2024, main PA is more dilated, there appears to be a PDA. ***** 12/18/24 CTA CHEST at [place] 1. No pulmonary embolism. Dilated main pulmonary artery suggestive of pulmonary hypertension.    2. Scattered areas of small area and large airway disease. Numerous lung nodules, probably  infectious/inflammatory in nature. 13 mm nodule at the basilar right lower lobe. Recommend follow-up CT chest in 6-12 months to ensure stability.    3. Aneurysmal thoracic aorta measuring up to 4.4 cm at the ascending aorta. Continued surveillance  is recommended.    4. Partial opacification of the hepatic veins suggestive of right heart failure.    5. 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     ***** 9/9/24 RHC Baseline /87/113, SpO2 89% on room air, SVC 57%, RA 65% PA 62% RA 10 RV 65/12 PA 69/34/41 PAWP 13 TP 28, DPG 21, PVR 9.3 LONG CO/CI (td) 3.0/1/9, SV 51, Svi 21 SVR 2746 dynes  PVR/SVR 0.27 Brennon 3.4, RVSWI 13.5  Val HR 51 /84/114, SpO2 100% PA 60/27/34 PAWP 15 CO/CI 73/1.75, SV 53, Svi 34 TPG 19, DPG 12  PVR 6.9  Severe precapillary disease, while she is at risk for WHO Group II and III PH, her hemodynamics are most consistent with  group I phenotype. Discussed with patient and  at bedside post procedure re: need for vasodilator therapy. Will plan for tadalafil + Opsumit for ease of polypharmacy, likely would benefit from full triple therapy + sotatercept, may be limited due to medication side effects, adherence and comorbidities. To follow up in clinic tomorrow to discuss further.  ***** 8/27/24 Pro   Na 146 (H) A1c 5.5% HIV, Hepatitis B and C non reactive Centromere Ab 2.7 (H) ESR, Rheumatoid factor, TSH, Scleroderma, ds DNA, Sjogren's SHERLY  WNL ***** 7/30/24 CT CHEST 1. Nonspecific bilateral pulmonary nodules measuring up to 1.3 cm in the right lower lobe. Recommend follow-up chest CT in 3 months to determine stability.  2. Severely dilated pulmonary artery representing pulmonary hypertension. Mosaic attenuation of the lungs likely secondary to pulmonary hypertension. **** 7/22/24 PFT FVC 1.65 (63) -> 1.85 (71) FEV1 0.77 (38) -> 0.78 (39) FEV1/FVC 59 -> 53 TLC 3.07 (62) DLCO 7.58 (41) There is moderate obstruction w/o sig BD response. Moderate restriction. No Air trapping. DLCO severely reduced *** 7/22/24 6MWT 183 meters Resting SpO2 92% on room air. Desaturation to 88% requiring supplemental O2 at 5L to maintain SpO2 > 88%. Mild Dyspnea (Mir 1) and fatigue (Mir 2.5). No rests taken. **** 7/3/24 ECHO 1. Left ventricular cavity is normal in size. Left ventricular systolic function is normal with an ejection fraction of 69 % by Jacobo's method of disks.  2. There is mild (grade 1) left ventricular diastolic dysfunction.  3. Mild left ventricular hypertrophy.  4. Normal right ventricular cavity size and normal right ventricular systolic function.  5. The left atrium is mildly dilated.  6. The right atrium is mildly dilated.  7. Aortic root at the sinuses of Valsalva is dilated, measuring 4.20 cm (indexed 2.74 cm/m). Ascending aorta diameter is dilated, measuring 4.50 cm (indexed 2.93 cm/m).  8. Mild aortic regurgitation.  9. Mild to moderate pulmonic regurgitation.  10. Dilated pulmonary artery. Main PA measures 4.0 cm. 1 1. Mild mitral regurgitation.  12. Mild tricuspid regurgitation.  13. Estimated pulmonary artery systolic pressure is 66 mmHg, consistent with severe pulmonary hypertension.  14. No pericardial effusion seen. 15. Compared to the transthoracic echocardiogram performed on 8/7/2018, ascending aorta is dilated, severe PHTN is present.

## 2025-04-17 NOTE — REASON FOR VISIT
[Spouse] : spouse [Home] : at home, [unfilled] , at the time of the visit. [Medical Office: ([facility])___] : at the medical office located in  [Telehealth (audio & video)] : This visit was provided via telehealth using real-time 2-way audio visual technology. [Verbal consent obtained from patient] : the patient, [unfilled] [Follow-Up] : a follow-up visit [Pulmonary Hypertension] : pulmonary hypertension [Shortness of Breath] : shortness of breath [FreeTextEntry4] : Yessenia Nicolas

## 2025-04-17 NOTE — PROCEDURE
[FreeTextEntry1] : CBC #3 3/26 Hgb 14.4 PLt 105 ***** CBC #2 3/5. Hgb 14.6, Plt 120 ***** 2/13/25 Home Sleep Study AHI 2 with saba SpO2 87% No evidence of sleep apnea ***** 1/15/25 LABS CBC canceled (specimen not labeled) Creatinine 0.84 LFTs WNL Pro  **** 12/18/24 ECHO at [place] 1. Left ventricular cavity is normal in size. Left ventricular systolic function is normal with an ejection fraction of 77 % by Jacobo's method of disks. 2. There is mild (grade 1) left ventricular diastolic dysfunction. 3. There is increased LV mass and concentric hypertrophy. 4. Normal right ventricular cavity size and normal right ventricular systolic function. 5. Left atrium is severely dilated. 6. The right atrium is mildly dilated. 7. Aortic root at the sinuses of Valsalva is dilated, measuring 4.20 cm (indexed 2.70 cm/m). Ascending aorta is dilated, measuring 4.40 cm (indexed 2.83 cm/m). 8. The main pulmonary artery is dilated at 4.6 cm. There appears to be a PDA. 9. Mild aortic regurgitation. 10. Moderate pulmonic regurgitation. 11. Mild mitral regurgitation. 12. Pulmonary artery systolic pressure could not be estimated. 13. No pericardial effusion seen. 14. Compared to the transthoracic echocardiogram performed on 7/3/2024, main PA is more dilated, there appears to be a PDA. ***** 12/18/24 CTA CHEST at [place] 1. No pulmonary embolism. Dilated main pulmonary artery suggestive of pulmonary hypertension.    2. Scattered areas of small area and large airway disease. Numerous lung nodules, probably  infectious/inflammatory in nature. 13 mm nodule at the basilar right lower lobe. Recommend follow-up CT chest in 6-12 months to ensure stability.    3. Aneurysmal thoracic aorta measuring up to 4.4 cm at the ascending aorta. Continued surveillance  is recommended.    4. Partial opacification of the hepatic veins suggestive of right heart failure.    5. 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     ***** 9/9/24 RHC Baseline /87/113, SpO2 89% on room air, SVC 57%, RA 65% PA 62% RA 10 RV 65/12 PA 69/34/41 PAWP 13 TP 28, DPG 21, PVR 9.3 LONG CO/CI (td) 3.0/1/9, SV 51, Svi 21 SVR 2746 dynes  PVR/SVR 0.27 Brennon 3.4, RVSWI 13.5  Val HR 51 /84/114, SpO2 100% PA 60/27/34 PAWP 15 CO/CI 73/1.75, SV 53, Svi 34 TPG 19, DPG 12  PVR 6.9  Severe precapillary disease, while she is at risk for WHO Group II and III PH, her hemodynamics are most consistent with  group I phenotype. Discussed with patient and  at bedside post procedure re: need for vasodilator therapy. Will plan for tadalafil + Opsumit for ease of polypharmacy, likely would benefit from full triple therapy + sotatercept, may be limited due to medication side effects, adherence and comorbidities. To follow up in clinic tomorrow to discuss further.  ***** 8/27/24 Pro   Na 146 (H) A1c 5.5% HIV, Hepatitis B and C non reactive Centromere Ab 2.7 (H) ESR, Rheumatoid factor, TSH, Scleroderma, ds DNA, Sjogren's SHERLY  WNL ***** 7/30/24 CT CHEST 1. Nonspecific bilateral pulmonary nodules measuring up to 1.3 cm in the right lower lobe. Recommend follow-up chest CT in 3 months to determine stability.  2. Severely dilated pulmonary artery representing pulmonary hypertension. Mosaic attenuation of the lungs likely secondary to pulmonary hypertension. **** 7/22/24 PFT FVC 1.65 (63) -> 1.85 (71) FEV1 0.77 (38) -> 0.78 (39) FEV1/FVC 59 -> 53 TLC 3.07 (62) DLCO 7.58 (41) There is moderate obstruction w/o sig BD response. Moderate restriction. No Air trapping. DLCO severely reduced *** 7/22/24 6MWT 183 meters Resting SpO2 92% on room air. Desaturation to 88% requiring supplemental O2 at 5L to maintain SpO2 > 88%. Mild Dyspnea (Mir 1) and fatigue (Mir 2.5). No rests taken. **** 7/3/24 ECHO 1. Left ventricular cavity is normal in size. Left ventricular systolic function is normal with an ejection fraction of 69 % by Jacobo's method of disks.  2. There is mild (grade 1) left ventricular diastolic dysfunction.  3. Mild left ventricular hypertrophy.  4. Normal right ventricular cavity size and normal right ventricular systolic function.  5. The left atrium is mildly dilated.  6. The right atrium is mildly dilated.  7. Aortic root at the sinuses of Valsalva is dilated, measuring 4.20 cm (indexed 2.74 cm/m). Ascending aorta diameter is dilated, measuring 4.50 cm (indexed 2.93 cm/m).  8. Mild aortic regurgitation.  9. Mild to moderate pulmonic regurgitation.  10. Dilated pulmonary artery. Main PA measures 4.0 cm. 1 1. Mild mitral regurgitation.  12. Mild tricuspid regurgitation.  13. Estimated pulmonary artery systolic pressure is 66 mmHg, consistent with severe pulmonary hypertension.  14. No pericardial effusion seen. 15. Compared to the transthoracic echocardiogram performed on 8/7/2018, ascending aorta is dilated, severe PHTN is present.

## 2025-05-08 NOTE — HISTORY OF PRESENT ILLNESS
[TextBox_4] : 73 year old F with asthma (Flovent Diskus) on cognitive disability and hearing loss here for evaluation pulmonary hypertension after seeing Dr. Janel Lomeli for cyanosis and shortness of breath. She is accompanied by her  who provides most of the history. In 2022 she had COVID pneumonia and was hospitalized. She did not require invasive ventilation and was monitored for a few days. Since then she has had significant neurologic decline most notable for short term memory loss. She also has a longstanding history of asthma since adulthood. She does not recall how she was diagnosed or if she's ever had PFTs, on flovent. In 4/2024 she was hospitalized at [facility] for pneumonia and discharged home on O2. Since discharge she has felt more fatigued, and with increased dyspnea on minimal exertion. She also notes that this was ongoing prior to her hospitalization but the pneumonia worsened it. She has a history of PDA requiring surgical closure and had CHF related to pregnancy many years ago. At our initial visit she had cyanosis of hands making SpO2 difficult, noted SOB w/ 100 ft. Underwent RHC which demonstrated severe precap PH (mPA 41, PAWP 13, CO/CI 3/1.9, PVR 9). Given severity of PVR it was felt this was vascular phenotype despite group II and III disease, she was placed on dual therapy w/ macitentan/tadalafil   PH Regimen tadalafil 20 mg daily [started October 2024, held Dec 18 2024, restart 1/2025] macitentan (Opsumit) 10 mg [started October 2024] sotatercept [started 2/19/25]  -CBC #1 12/30 Hgb 13.2 [id number] -CBC #2 3/5. Hgb 14.6, Plt 120 pro  -CBC #3 3/26 Hgb 14.4 Plt 105 pro  -CBC #4 4/16 Hgb 13.9 Plt 121 pro  -CBC #5 5/7  Hgb 12.7  Plt 130 pro   5-8-25 Her breathing is "OK".  Still gets a little winded when she exerts herself. SpO2 on room air 88-94% Uses the oxygen for long walks. Wears it after dressing in the morning. Sometimes uses O2 before activities that tax her. Using oxygen less than she was a few months ago Has been without any infections for a god stretch of time weight has been 118-120 lbs on 20 mg Lasix daily Has a neurology appointment scheduled for June. Will do echo, PFT and 6MWT in May.  The dizziness episodes are daily, especially in the am until about 1-2 pm. Less so in the afternoon. Went to ENT. Her L hearing aide is not working properly. That is contributing to off balance. She had a vestibular study at [place]. Did not have dizziness at the time of the exam. She does not have classic vertigo. Offered balance therapy.  Fatigue is stable Has been taking tadalafil and Opsumit at 10:30 at night No epistaxis. No bruising.  Has a new pessary that should stave off surgery for a while.

## 2025-05-08 NOTE — REASON FOR VISIT
[Follow-Up] : a follow-up visit [Pulmonary Hypertension] : pulmonary hypertension [Shortness of Breath] : shortness of breath [Spouse] : spouse [Home] : at home, [unfilled] , at the time of the visit. [Medical Office: ([facility])___] : at the medical office located in  [Telehealth (audio & video)] : This visit was provided via telehealth using real-time 2-way audio visual technology. [Verbal consent obtained from patient] : the patient, [unfilled] [FreeTextEntry4] : Yessenia Nicolas

## 2025-05-08 NOTE — PROCEDURE
[FreeTextEntry1] : CBC #3 3/26 Hgb 14.4 PLt 105 ***** CBC #2 3/5. Hgb 14.6, Plt 120 ***** 2/13/25 Home Sleep Study AHI 2 with saba SpO2 87% No evidence of sleep apnea ***** 1/15/25 LABS CBC canceled (specimen not labeled) Creatinine 0.84 LFTs WNL Pro  **** 12/18/24 ECHO at [place] 1. Left ventricular cavity is normal in size. Left ventricular systolic function is normal with an ejection fraction of 77 % by Jacobo's method of disks. 2. There is mild (grade 1) left ventricular diastolic dysfunction. 3. There is increased LV mass and concentric hypertrophy. 4. Normal right ventricular cavity size and normal right ventricular systolic function. 5. Left atrium is severely dilated. 6. The right atrium is mildly dilated. 7. Aortic root at the sinuses of Valsalva is dilated, measuring 4.20 cm (indexed 2.70 cm/m). Ascending aorta is dilated, measuring 4.40 cm (indexed 2.83 cm/m). 8. The main pulmonary artery is dilated at 4.6 cm. There appears to be a PDA. 9. Mild aortic regurgitation. 10. Moderate pulmonic regurgitation. 11. Mild mitral regurgitation. 12. Pulmonary artery systolic pressure could not be estimated. 13. No pericardial effusion seen. 14. Compared to the transthoracic echocardiogram performed on 7/3/2024, main PA is more dilated, there appears to be a PDA. ***** 12/18/24 CTA CHEST at [place] 1. No pulmonary embolism. Dilated main pulmonary artery suggestive of pulmonary hypertension.    2. Scattered areas of small area and large airway disease. Numerous lung nodules, probably  infectious/inflammatory in nature. 13 mm nodule at the basilar right lower lobe. Recommend follow-up CT chest in 6-12 months to ensure stability.    3. Aneurysmal thoracic aorta measuring up to 4.4 cm at the ascending aorta. Continued surveillance  is recommended.    4. Partial opacification of the hepatic veins suggestive of right heart failure.    5. 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     ***** 9/9/24 RHC Baseline /87/113, SpO2 89% on room air, SVC 57%, RA 65% PA 62% RA 10 RV 65/12 PA 69/34/41 PAWP 13 TP 28, DPG 21, PVR 9.3 LONG CO/CI (td) 3.0/1/9, SV 51, Svi 21 SVR 2746 dynes  PVR/SVR 0.27 Brennon 3.4, RVSWI 13.5  Val HR 51 /84/114, SpO2 100% PA 60/27/34 PAWP 15 CO/CI 73/1.75, SV 53, Svi 34 TPG 19, DPG 12  PVR 6.9  Severe precapillary disease, while she is at risk for WHO Group II and III PH, her hemodynamics are most consistent with  group I phenotype. Discussed with patient and  at bedside post procedure re: need for vasodilator therapy. Will plan for tadalafil + Opsumit for ease of polypharmacy, likely would benefit from full triple therapy + sotatercept, may be limited due to medication side effects, adherence and comorbidities. To follow up in clinic tomorrow to discuss further.  ***** 8/27/24 Pro   Na 146 (H) A1c 5.5% HIV, Hepatitis B and C non reactive Centromere Ab 2.7 (H) ESR, Rheumatoid factor, TSH, Scleroderma, ds DNA, Sjogren's SHERLY  WNL ***** 7/30/24 CT CHEST 1. Nonspecific bilateral pulmonary nodules measuring up to 1.3 cm in the right lower lobe. Recommend follow-up chest CT in 3 months to determine stability.  2. Severely dilated pulmonary artery representing pulmonary hypertension. Mosaic attenuation of the lungs likely secondary to pulmonary hypertension. **** 7/22/24 PFT FVC 1.65 (63) -> 1.85 (71) FEV1 0.77 (38) -> 0.78 (39) FEV1/FVC 59 -> 53 TLC 3.07 (62) DLCO 7.58 (41) There is moderate obstruction w/o sig BD response. Moderate restriction. No Air trapping. DLCO severely reduced *** 7/22/24 6MWT 183 meters Resting SpO2 92% on room air. Desaturation to 88% requiring supplemental O2 at 5L to maintain SpO2 > 88%. Mild Dyspnea (Mir 1) and fatigue (Mir 2.5). No rests taken. **** 7/3/24 ECHO 1. Left ventricular cavity is normal in size. Left ventricular systolic function is normal with an ejection fraction of 69 % by Jacobo's method of disks.  2. There is mild (grade 1) left ventricular diastolic dysfunction.  3. Mild left ventricular hypertrophy.  4. Normal right ventricular cavity size and normal right ventricular systolic function.  5. The left atrium is mildly dilated.  6. The right atrium is mildly dilated.  7. Aortic root at the sinuses of Valsalva is dilated, measuring 4.20 cm (indexed 2.74 cm/m). Ascending aorta diameter is dilated, measuring 4.50 cm (indexed 2.93 cm/m).  8. Mild aortic regurgitation.  9. Mild to moderate pulmonic regurgitation.  10. Dilated pulmonary artery. Main PA measures 4.0 cm. 1 1. Mild mitral regurgitation.  12. Mild tricuspid regurgitation.  13. Estimated pulmonary artery systolic pressure is 66 mmHg, consistent with severe pulmonary hypertension.  14. No pericardial effusion seen. 15. Compared to the transthoracic echocardiogram performed on 8/7/2018, ascending aorta is dilated, severe PHTN is present.

## 2025-05-08 NOTE — ASSESSMENT
[FreeTextEntry1] : 74 year old F with asthma (advair Diskus) here for evaluation pulmonary hypertension, found to have severe precapillary PH on RHC  Most likely idiopathic PAH, possible contribution of prior congenital heart disease. At risk for multifactorial PH: WHO group I (congenital s/p PDA closure) vs WHO group II (diastolic dysfunction, enlarged LA) vs WHO Group III (combined restriction/obstructive pattern, CT chest with faint centrilobular GGO and mosaicism, dilated PA 5.4cm, and dilated esophagus with debris). 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 PFTs with obstruction and restriction + severely reduced DLCO  (FEV1/FVC 59%, FEV1 39%, FVC 63%, TLC 62%, DLCO 41%, FVC/DCLO 1.5). 6MWT 183m severely reduced walk distance. RHC done 9/9/24 - severe precapillary disease with low CO/CI. Shunt run suggestive of bidirectional shunt, possible opening of PFO but this is likely protective due to severe PH (SVC to PA increase of 5%, Ao sat 89%)  REVEAL RISK: INT (if considered idiopathic)   Started on dual oral therapy due to concern of more complicated/titrated medications with her significant cognitive decline and difficulty with medications. Initially noted definite improvement although some episodic sudden RIVAS for which may have been more related to airway disease and stepped up Advair therapy. Now her breathing symptoms are improved but she is having frequent dizzy spells. Holter monitor was placed on 12/16/24, she has had multiple episodes since then and 3 ER visits. Given worsening dizziness without worsening RV size/function and only mild increase in NTproBNP, decision made to temporarily hold tadalafil but it has since been resumed as there was not much difference off it. Her multifactorial PH with group I, II, and III RF may make her intolerant to dual oral therapy vs. her dizziness could be a result of worsening PH vs it may be vertigo but recent vestibular evaluation did not support vertigo. Alternatively, she could have had a stroke in the previous months.   Constant attempts to adjust medications have not altered the dizziness, need to consider it is unrelated to PAH or it's treatment.   Plan: - Continue tadalafil to 40 mg daily - continue 20 mg Lasix daily and increase to 30 mg daily for a few days if weight exceeds 121.5 lbs. Hold dose if weight drops below 118 lbs - continue Opsumit - Continue sotatercept. Will receive next dose (target dose 0.7 ml of 45 mg vial) 5/14 (Winrevair #5)  (had initial dose 2/19).  - use 2L O2 as needed to maintain SpO2 >88%. Pt is ambulatory in the home and community and benefits from portable oxygen - Will need to consider imaging of her congenital heart disease w/ cMRI (coil in chest is her PDA coil, MRI compatible) - Counseled about small, non carbohydrate heavy meals, especially breakfast to avoid postprandial hypotension. Also recommended compression stockings in case there is an element of orthostasis. - follow up with ENT regarding off balance and dizziness. If not explained would consult neuro - PFT and 6MWT before June visit - echocardiogram in May - Pt is seeing neuro at the end of June. Consider brain imaging to look for evidence of stroke that may explain the ongoing dizziness  RTC in June

## 2025-06-10 NOTE — PROCEDURE
[FreeTextEntry1] : 6/4/25 ECHO LA is normal. LVH. EF 70% Mild AR Mild-moderate MR Mild TR PASP 42 RA normal. RV size and function normal Moderate ID Trivial pericardial effusion **** 5/12/25 PFT FVC 1.78 (66) -> 1.73 (64) FEV1 0.74 (36) -> 0.78 (37) FEV1/FVC 53 -> 57 TLC 7.45 (147) RV/TLC 5.94 (171) DLCO 7.96 (42)  There is moderate obstruction w/o sig BD response. Severe hyperinflation. Severe air trapping. DLCO severely reduced.  Patient had significant difficulty following directions and forming seal during spirometry which may affect validity of values  6MWT 256 meters patient used rolling walker for balance Resting SpO2 92% on room air HR 74 /78 End test SpO2 90% on room air HR 89 /75 Mir 0 **** ***** 2/13/25 Home Sleep Study AHI 2 with saba SpO2 87% No evidence of sleep apnea ***** 1/15/25 LABS CBC canceled (specimen not labeled) Creatinine 0.84 LFTs WNL Pro  **** 12/18/24 ECHO at [place] 1. Left ventricular cavity is normal in size. Left ventricular systolic function is normal with an ejection fraction of 77 % by Jacobo's method of disks. 2. There is mild (grade 1) left ventricular diastolic dysfunction. 3. There is increased LV mass and concentric hypertrophy. 4. Normal right ventricular cavity size and normal right ventricular systolic function. 5. Left atrium is severely dilated. 6. The right atrium is mildly dilated. 7. Aortic root at the sinuses of Valsalva is dilated, measuring 4.20 cm (indexed 2.70 cm/m). Ascending aorta is dilated, measuring 4.40 cm (indexed 2.83 cm/m). 8. The main pulmonary artery is dilated at 4.6 cm. There appears to be a PDA. 9. Mild aortic regurgitation. 10. Moderate pulmonic regurgitation. 11. Mild mitral regurgitation. 12. Pulmonary artery systolic pressure could not be estimated. 13. No pericardial effusion seen. 14. Compared to the transthoracic echocardiogram performed on 7/3/2024, main PA is more dilated, there appears to be a PDA. ***** 12/18/24 CTA CHEST at [place] 1. No pulmonary embolism. Dilated main pulmonary artery suggestive of pulmonary hypertension.    2. Scattered areas of small area and large airway disease. Numerous lung nodules, probably  infectious/inflammatory in nature. 13 mm nodule at the basilar right lower lobe. Recommend follow-up CT chest in 6-12 months to ensure stability.    3. Aneurysmal thoracic aorta measuring up to 4.4 cm at the ascending aorta. Continued surveillance  is recommended.    4. Partial opacification of the hepatic veins suggestive of right heart failure.    5. 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     ***** 9/9/24 RHC Baseline /87/113, SpO2 89% on room air, SVC 57%, RA 65% PA 62% RA 10 RV 65/12 PA 69/34/41 PAWP 13 TP 28, DPG 21, PVR 9.3 LONG CO/CI (td) 3.0/1/9, SV 51, Svi 21 SVR 2746 dynes  PVR/SVR 0.27 Brennon 3.4, RVSWI 13.5  Val HR 51 /84/114, SpO2 100% PA 60/27/34 PAWP 15 CO/CI 73/1.75, SV 53, Svi 34 TPG 19, DPG 12  PVR 6.9  Severe precapillary disease, while she is at risk for WHO Group II and III PH, her hemodynamics are most consistent with  group I phenotype. Discussed with patient and  at bedside post procedure re: need for vasodilator therapy. Will plan for tadalafil + Opsumit for ease of polypharmacy, likely would benefit from full triple therapy + sotatercept, may be limited due to medication side effects, adherence and comorbidities. To follow up in clinic tomorrow to discuss further.  ***** 8/27/24 Pro   Na 146 (H) A1c 5.5% HIV, Hepatitis B and C non reactive Centromere Ab 2.7 (H) ESR, Rheumatoid factor, TSH, Scleroderma, ds DNA, Sjogren's SHERLY  WNL ***** 7/30/24 CT CHEST 1. Nonspecific bilateral pulmonary nodules measuring up to 1.3 cm in the right lower lobe. Recommend follow-up chest CT in 3 months to determine stability.  2. Severely dilated pulmonary artery representing pulmonary hypertension. Mosaic attenuation of the lungs likely secondary to pulmonary hypertension. **** 7/22/24 PFT FVC 1.65 (63) -> 1.85 (71) FEV1 0.77 (38) -> 0.78 (39) FEV1/FVC 59 -> 53 TLC 3.07 (62) DLCO 7.58 (41) There is moderate obstruction w/o sig BD response. Moderate restriction. No Air trapping. DLCO severely reduced *** 7/22/24 6MWT 183 meters Resting SpO2 92% on room air. Desaturation to 88% requiring supplemental O2 at 5L to maintain SpO2 > 88%. Mild Dyspnea (Mir 1) and fatigue (Mir 2.5). No rests taken. **** 7/3/24 ECHO 1. Left ventricular cavity is normal in size. Left ventricular systolic function is normal with an ejection fraction of 69 % by Jacobo's method of disks.  2. There is mild (grade 1) left ventricular diastolic dysfunction.  3. Mild left ventricular hypertrophy.  4. Normal right ventricular cavity size and normal right ventricular systolic function.  5. The left atrium is mildly dilated.  6. The right atrium is mildly dilated.  7. Aortic root at the sinuses of Valsalva is dilated, measuring 4.20 cm (indexed 2.74 cm/m). Ascending aorta diameter is dilated, measuring 4.50 cm (indexed 2.93 cm/m).  8. Mild aortic regurgitation.  9. Mild to moderate pulmonic regurgitation.  10. Dilated pulmonary artery. Main PA measures 4.0 cm. 1 1. Mild mitral regurgitation.  12. Mild tricuspid regurgitation.  13. Estimated pulmonary artery systolic pressure is 66 mmHg, consistent with severe pulmonary hypertension.  14. No pericardial effusion seen. 15. Compared to the transthoracic echocardiogram performed on 8/7/2018, ascending aorta is dilated, severe PHTN is present.

## 2025-06-10 NOTE — HISTORY OF PRESENT ILLNESS
[TextBox_4] : 73 year old F with asthma (Flovent Diskus) on cognitive disability and hearing loss here for evaluation pulmonary hypertension after seeing Dr. Janel Lomeli for cyanosis and shortness of breath. She is accompanied by her  who provides most of the history. In 2022 she had COVID pneumonia and was hospitalized. She did not require invasive ventilation and was monitored for a few days. Since then she has had significant neurologic decline most notable for short term memory loss. She also has a longstanding history of asthma since adulthood. She does not recall how she was diagnosed or if she's ever had PFTs, on flovent. In 4/2024 she was hospitalized at [facility] for pneumonia and discharged home on O2. Since discharge she has felt more fatigued, and with increased dyspnea on minimal exertion. She also notes that this was ongoing prior to her hospitalization but the pneumonia worsened it. She has a history of PDA requiring surgical closure and had CHF related to pregnancy many years ago. At our initial visit she had cyanosis of hands making SpO2 difficult, noted SOB w/ 100 ft. Underwent RHC which demonstrated severe precap PH (mPA 41, PAWP 13, CO/CI 3/1.9, PVR 9). Given severity of PVR it was felt this was vascular phenotype despite group II and III disease, she was placed on dual therapy w/ macitentan/tadalafil   PH Regimen tadalafil 20 mg daily [started October 2024, held Dec 18 2024, restart 1/2025] macitentan (Opsumit) 10 mg [started October 2024] [sotatercept started 2/19/25, stopped May 2025 for persistent dizziness]  -CBC #1 12/30 Hgb 13.2 [id number] -CBC #2 3/5. Hgb 14.6, Plt 120 pro  -CBC #3 3/26 Hgb 14.4 Plt 105 pro  -CBC #4 4/16 Hgb 13.9 Plt 121 pro  -CBC #5 5/7  Hgb 12.7  Plt 130 pro   6-10-25 Changes her inhaler to Trelegy 200 after recent PFTs showed Severe hyperinflation. Severe air trapping. DLCO severely reduced. She is doing well with the Trelegy. Rinsing her mouth after.  Hardly ever uses rescue inhaler anymore, last used 1.5 weeks ago. No more huffing and puffing  and family note a correlation between starting Winrevair and the dizziness, especially 10 days after each injection. In the past we had done a 1 time dose reduction with no discernable impact on the dizziness. Instructed  to hold the next dose (6/4) and discuss holding the next few doses at today's appointment  She has seen ENT about the dizziness, does not seem to be vertigo. Seeing neuro at the end of June. Consider brain imaging to look for evidence of stroke that may explain the ongoing dizziness Did echo 6/4 She is most "out of it" in the mornings Getting up and starting to walk sets off the dizzy spells. Trying to change positions more slowly. Dizzy spells have not been lasting as long or as intense.  Ankles have been swollen L > R the past few days. Wt has been stable, max 120 pounds. Has been taking 30 mg of furosemide for 3-4 days. It is minimally better, pt feels it is better. Using compression stockings.  Has a neurology appointment later this month. Creatinine has been increasing over the last year (0.66 -> 1.18) Has a nephrology appointment 6/19.  The new pessary is working our well, no immediate plans for surgery.  No epistaxis. No bruising. Going to a balance center at [place]. Does not get dizzy during the class. Sometimes has to pause and rest because she gets a little out of breath.  She uses oxygen at 2L 1-2 hours per day as needed. Family observes her memory improves when she wears the oxygen.

## 2025-06-10 NOTE — HISTORY OF PRESENT ILLNESS
[TextBox_4] : 73 year old F with asthma (Flovent Diskus) on cognitive disability and hearing loss here for evaluation pulmonary hypertension after seeing Dr. Janel Lomeli for cyanosis and shortness of breath. She is accompanied by her  who provides most of the history. In 2022 she had COVID pneumonia and was hospitalized. She did not require invasive ventilation and was monitored for a few days. Since then she has had significant neurologic decline most notable for short term memory loss. She also has a longstanding history of asthma since adulthood. She does not recall how she was diagnosed or if she's ever had PFTs, on flovent. In 4/2024 she was hospitalized at [facility] for pneumonia and discharged home on O2. Since discharge she has felt more fatigued, and with increased dyspnea on minimal exertion. She also notes that this was ongoing prior to her hospitalization but the pneumonia worsened it. She has a history of PDA requiring surgical closure and had CHF related to pregnancy many years ago. At our initial visit she had cyanosis of hands making SpO2 difficult, noted SOB w/ 100 ft. Underwent RHC which demonstrated severe precap PH (mPA 41, PAWP 13, CO/CI 3/1.9, PVR 9). Given severity of PVR it was felt this was vascular phenotype despite group II and III disease, she was placed on dual therapy w/ macitentan/tadalafil   PH Regimen tadalafil 20 mg daily [started October 2024, held Dec 18 2024, restart 1/2025] macitentan (Opsumit) 10 mg [started October 2024] [sotatercept started 2/19/25, stopped May 2025 for persistent dizziness]  -CBC #1 12/30 Hgb 13.2 [id number] -CBC #2 3/5. Hgb 14.6, Plt 120 pro  -CBC #3 3/26 Hgb 14.4 Plt 105 pro  -CBC #4 4/16 Hgb 13.9 Plt 121 pro  -CBC #5 5/7  Hgb 12.7  Plt 130 pro   6-10-25 Changes her inhaler to Trelegy 200 after recent PFTs showed Severe hyperinflation. Severe air trapping. DLCO severely reduced. She is doing well with the Trelegy. Rinsing her mouth after.  Hardly ever uses rescue inhaler anymore, last used 1.5 weeks ago. No more huffing and puffing  and family note a correlation between starting Winrevair and the dizziness, especially 10 days after each injection. In the past we had done a 1 time dose reduction with no discernable impact on the dizziness. Instructed  to hold the next dose (6/4) and discuss holding the next few doses at today's appointment  She has seen ENT about the dizziness, does not seem to be vertigo. Seeing neuro at the end of June. Consider brain imaging to look for evidence of stroke that may explain the ongoing dizziness Did echo 6/4 She is most "out of it" in the mornings Getting up and starting to walk sets off the dizzy spells. Trying to change positions more slowly. Dizzy spells have not been lasting as long or as intense.  Ankles have been swollen L > R the past few days. Wt has been stable, max 120 pounds. Has been taking 30 mg of furosemide for 3-4 days. It is minimally better, pt feels it is better. Using compression stockings.  Has a neurology appointment later this month. Creatinine has been increasing over the last year (0.66 -> 1.18) Has a nephrology appointment 6/19.  The new pessary is working our well, no immediate plans for surgery.  No epistaxis. No bruising. Going to a balance center at [place]. Does not get dizzy during the class. Sometimes has to pause and rest because she gets a little out of breath.  She uses oxygen at 2L 1-2 hours per day as needed. Family observes her memory improves when she wears the oxygen.

## 2025-06-10 NOTE — PROCEDURE
[FreeTextEntry1] : 6/4/25 ECHO LA is normal. LVH. EF 70% Mild AR Mild-moderate MR Mild TR PASP 42 RA normal. RV size and function normal Moderate RI Trivial pericardial effusion **** 5/12/25 PFT FVC 1.78 (66) -> 1.73 (64) FEV1 0.74 (36) -> 0.78 (37) FEV1/FVC 53 -> 57 TLC 7.45 (147) RV/TLC 5.94 (171) DLCO 7.96 (42)  There is moderate obstruction w/o sig BD response. Severe hyperinflation. Severe air trapping. DLCO severely reduced.  Patient had significant difficulty following directions and forming seal during spirometry which may affect validity of values  6MWT 256 meters patient used rolling walker for balance Resting SpO2 92% on room air HR 74 /78 End test SpO2 90% on room air HR 89 /75 Mir 0 **** ***** 2/13/25 Home Sleep Study AHI 2 with saba SpO2 87% No evidence of sleep apnea ***** 1/15/25 LABS CBC canceled (specimen not labeled) Creatinine 0.84 LFTs WNL Pro  **** 12/18/24 ECHO at [facility] 1. Left ventricular cavity is normal in size. Left ventricular systolic function is normal with an ejection fraction of 77 % by Jacobo's method of disks. 2. There is mild (grade 1) left ventricular diastolic dysfunction. 3. There is increased LV mass and concentric hypertrophy. 4. Normal right ventricular cavity size and normal right ventricular systolic function. 5. Left atrium is severely dilated. 6. The right atrium is mildly dilated. 7. Aortic root at the sinuses of Valsalva is dilated, measuring 4.20 cm (indexed 2.70 cm/m). Ascending aorta is dilated, measuring 4.40 cm (indexed 2.83 cm/m). 8. The main pulmonary artery is dilated at 4.6 cm. There appears to be a PDA. 9. Mild aortic regurgitation. 10. Moderate pulmonic regurgitation. 11. Mild mitral regurgitation. 12. Pulmonary artery systolic pressure could not be estimated. 13. No pericardial effusion seen. 14. Compared to the transthoracic echocardiogram performed on 7/3/2024, main PA is more dilated, there appears to be a PDA. ***** 12/18/24 CTA CHEST at [facility] 1. No pulmonary embolism. Dilated main pulmonary artery suggestive of pulmonary hypertension.    2. Scattered areas of small area and large airway disease. Numerous lung nodules, probably  infectious/inflammatory in nature. 13 mm nodule at the basilar right lower lobe. Recommend follow-up CT chest in 6-12 months to ensure stability.    3. Aneurysmal thoracic aorta measuring up to 4.4 cm at the ascending aorta. Continued surveillance  is recommended.    4. Partial opacification of the hepatic veins suggestive of right heart failure.    5. 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     ***** 9/9/24 RHC Baseline /87/113, SpO2 89% on room air, SVC 57%, RA 65% PA 62% RA 10 RV 65/12 PA 69/34/41 PAWP 13 TP 28, DPG 21, PVR 9.3 LONG CO/CI (td) 3.0/1/9, SV 51, Svi 21 SVR 2746 dynes  PVR/SVR 0.27 Brennon 3.4, RVSWI 13.5  Val HR 51 /84/114, SpO2 100% PA 60/27/34 PAWP 15 CO/CI 73/1.75, SV 53, Svi 34 TPG 19, DPG 12  PVR 6.9  Severe precapillary disease, while she is at risk for WHO Group II and III PH, her hemodynamics are most consistent with  group I phenotype. Discussed with patient and  at bedside post procedure re: need for vasodilator therapy. Will plan for tadalafil + Opsumit for ease of polypharmacy, likely would benefit from full triple therapy + sotatercept, may be limited due to medication side effects, adherence and comorbidities. To follow up in clinic tomorrow to discuss further.  ***** 8/27/24 Pro   Na 146 (H) A1c 5.5% HIV, Hepatitis B and C non reactive Centromere Ab 2.7 (H) ESR, Rheumatoid factor, TSH, Scleroderma, ds DNA, Sjogren's SHERLY  WNL ***** 7/30/24 CT CHEST 1. Nonspecific bilateral pulmonary nodules measuring up to 1.3 cm in the right lower lobe. Recommend follow-up chest CT in 3 months to determine stability.  2. Severely dilated pulmonary artery representing pulmonary hypertension. Mosaic attenuation of the lungs likely secondary to pulmonary hypertension. **** 7/22/24 PFT FVC 1.65 (63) -> 1.85 (71) FEV1 0.77 (38) -> 0.78 (39) FEV1/FVC 59 -> 53 TLC 3.07 (62) DLCO 7.58 (41) There is moderate obstruction w/o sig BD response. Moderate restriction. No Air trapping. DLCO severely reduced *** 7/22/24 6MWT 183 meters Resting SpO2 92% on room air. Desaturation to 88% requiring supplemental O2 at 5L to maintain SpO2 > 88%. Mild Dyspnea (Mir 1) and fatigue (Mir 2.5). No rests taken. **** 7/3/24 ECHO 1. Left ventricular cavity is normal in size. Left ventricular systolic function is normal with an ejection fraction of 69 % by Jacobo's method of disks.  2. There is mild (grade 1) left ventricular diastolic dysfunction.  3. Mild left ventricular hypertrophy.  4. Normal right ventricular cavity size and normal right ventricular systolic function.  5. The left atrium is mildly dilated.  6. The right atrium is mildly dilated.  7. Aortic root at the sinuses of Valsalva is dilated, measuring 4.20 cm (indexed 2.74 cm/m). Ascending aorta diameter is dilated, measuring 4.50 cm (indexed 2.93 cm/m).  8. Mild aortic regurgitation.  9. Mild to moderate pulmonic regurgitation.  10. Dilated pulmonary artery. Main PA measures 4.0 cm. 1 1. Mild mitral regurgitation.  12. Mild tricuspid regurgitation.  13. Estimated pulmonary artery systolic pressure is 66 mmHg, consistent with severe pulmonary hypertension.  14. No pericardial effusion seen. 15. Compared to the transthoracic echocardiogram performed on 8/7/2018, ascending aorta is dilated, severe PHTN is present.

## 2025-06-10 NOTE — ASSESSMENT
[FreeTextEntry1] : 74 year old F with asthma (advair Diskus) here for evaluation pulmonary hypertension, found to have severe precapillary PH on RHC  Most likely idiopathic PAH, possible contribution of prior congenital heart disease. At risk for multifactorial PH: WHO group I (congenital s/p PDA closure) vs WHO group II (diastolic dysfunction, enlarged LA) vs WHO Group III (combined restriction/obstructive pattern, CT chest with faint centrilobular GGO and mosaicism, dilated PA 5.4cm, and dilated esophagus with debris). 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 PFTs with obstruction and restriction + severely reduced DLCO  (FEV1/FVC 59%, FEV1 39%, FVC 63%, TLC 62%, DLCO 41%, FVC/DCLO 1.5). Initial 6MWT 183m severely reduced walk distance. RHC done 9/9/24 - severe precapillary disease with low CO/CI. Shunt run suggestive of bidirectional shunt, possible opening of PFO but this is likely protective due to severe PH (SVC to PA increase of 5%, Ao sat 89%)  REVEAL RISK: INT (if considered idiopathic)   Started on dual oral therapy, had issues with episodes of severe dizziness, holter negative. Did feel there was a RIVAS improvement but would get occasional "attacks". Inhaler therapy has been stepped up, now on trelegy. She was started on sotatercept but this seemed to worsen her dizziness. Decision made to hold June dose of sotatercept.  feels there has been an improvement in her dizziness. She has also been to see ENT and neuro re: dizzy spells. Overall, today's visit is best she has been in a while. No significant RIVAS, mostly dizziness with positional changes, tolerating trelegy, tadalafil, and opsumit. Will adjust timing of medications to avoid stacking  6MWT has significantly improved by 80m, her PFTs continue to show severe obstruction and, most notably, there is now severe air trapping with RV > 250% which does not correlate with her exam or symptoms. Favor short interval repeat in case this is a measurement error.   Plan: - continue Trelegy daily - Continue tadalafil to 40 mg daily at night - continue Opsumit and switch to AM - continue 30 mg furosemide  daily for the rest of the week as she has persistent ankle edema. NTpro BNP today - HOLD sotatercept (had initial dose 2/19) and assess if dizziness dissipates. Re-consider in September - use 2L O2 as needed to maintain SpO2 >88%. Encouraged her to wear it overnight. Pt is ambulatory in the home and community and benefits from portable oxygen - follow up with ENT regarding off balance and dizziness. Continue with balance classes - repeat PFT in 3 months to reassess RV - 6MWT annually or sooner if needed - echocardiogram in June 2026 - Pt is seeing neuro at the end of June. Consider brain imaging to look for evidence of stroke that may explain the ongoing dizziness - Nephrology evaluation for increasing creatinine requested by her PCP  RTC September

## 2025-06-19 NOTE — REVIEW OF SYSTEMS
[Dizziness] : dizziness [Fever] : no fever [Chills] : no chills [Chest Pain] : no chest pain [Palpitations] : no palpitations [Cough] : no cough [SOB on Exertion] : no shortness of breath during exertion [Constipation] : no constipation [Diarrhea] : no diarrhea

## 2025-06-19 NOTE — PHYSICAL EXAM
[General Appearance - Alert] : alert [General Appearance - In No Acute Distress] : in no acute distress [Jugular Venous Distention Increased] : there was no jugular-venous distention [] : no respiratory distress [Respiration, Rhythm And Depth] : normal respiratory rhythm and effort [Exaggerated Use Of Accessory Muscles For Inspiration] : no accessory muscle use [Auscultation Breath Sounds / Voice Sounds] : lungs were clear to auscultation bilaterally [Heart Sounds] : normal S1 and S2 [Edema] : there was no peripheral edema [Abdomen Soft] : soft [Abdomen Tenderness] : non-tender [Abnormal Walk] : normal gait [Oriented To Time, Place, And Person] : oriented to person, place, and time

## 2025-06-19 NOTE — ASSESSMENT
[FreeTextEntry1] : # DUSTIN  Likely over diuresis vs Tadalafil  -cr 1.18, egfr 48, baseline cr 0.8  -Pro BNP ~ 600 -change Lasix to 20 mg every other day  -Daily weight. Baseline weight 119-120, if gaining weight give Lasix until weight back to baseline.  - Repeat bmp 4 weeks  -Obtain ua, upcr  -Hx of uterine prolapse s/p pessary, obtain renal us -Avoid nephrotoxic med as possible

## 2025-06-19 NOTE — HISTORY OF PRESENT ILLNESS
[FreeTextEntry1] : Hx of pulm htn on home O2 as needed, asthma, urine prolapse. Most recent lab with cr 1.18, egfr 48, from normal kidney function cr 0.8, egr 70s.  Denies recent illness.  Patient is taking Furosemide taking 20-30 mg base on weight. Baseline weight ~ 119lbs.  Pro bnp ~ 600. she was winrevair which was dc to dizziness, macetentan and currently on tadalafil.

## 2025-06-29 NOTE — HISTORY OF PRESENT ILLNESS
[FreeTextEntry1] : Edita Nicolas is a 74 year old woman with a pertinent medical history of COPD, pulmonary HTN, CHD, valvular heart disease, presenting today for consultation and evaluation of memory complaints. Edita is accompanied by her  at today's visit.   Edita is a poor historian regarding her memory complaints. Her  provided a detailed history regarding her memory decline over the last several years. Eidta's  states her memory decline began shortly after nikita covid in  and she was hospitalized for 2 weeks. He states she had a formal memory evaluation during that time which suggested a cognitive impairment. No records of this exam are available to review during todays encounter. Edita has never had an MRI Brain. There were some concerns from family previously regarding possible metal from a PFO closure 35 years ago, so they declined imaging. Edita's  states her short term memory (conversations, word finding difficulty) has mostly been affected, some long-term memory impairment. Edita is able to dress herself, bathe herself, needs help at times if she gets dizzy or short of breath (wears O2 PRN). Edita helps to cook meals in the home, but never alone. No safety concerns in the kitchen per her . Edita has not drove since around . Edita is not managing finances but her  keeps her involved in their finances.   Edita states she feels she is sleeping generally well at night time, however her  states she only gets 4-5 hours per night on avg to use the bathroom or difficulty maintaining sleep. Her  states Edita does have some sundowning at night time- asking who her  is, where are they. He states she is redirectable. Typically Edita and her  travel a lot. They had 4 week trip to [place] 1 yr ago. She would forget who her family was during the evening. She was shown pics of them on an ipad and was able to recall who they were. They have not traveled as much this last year as its become more difficult for them. Her  states when Edita is overly tired he notices she gets more forgetful.   Medications: Albuterol PRN Furosemide Latanoprost drops Opsumit Tadalafil Winrevair Zyrtec  Allergies: PCN Verapamil  Eggs Latex  Social: Lives in a home in [facility] [place] Lives with , 2 sons 4 children, 3 living. 1 child  at the age of 2 Highest level of education: 12th grade, 1 yr of college No smoking history, no drinking Wears hearing aids b/l.

## 2025-06-29 NOTE — HISTORY OF PRESENT ILLNESS
[FreeTextEntry1] : Edita Nicolas is a 74 year old woman with a pertinent medical history of COPD, pulmonary HTN, CHD, valvular heart disease, presenting today for consultation and evaluation of memory complaints. Edita is accompanied by her  at today's visit.   Edita is a poor historian regarding her memory complaints. Her  provided a detailed history regarding her memory decline over the last several years. Edita's  states her memory decline began shortly after nikita covid in  and she was hospitalized for 2 weeks. He states she had a formal memory evaluation during that time which suggested a cognitive impairment. No records of this exam are available to review during todays encounter. Edita has never had an MRI Brain. There were some concerns from family previously regarding possible metal from a PFO closure 35 years ago, so they declined imaging. Edita's  states her short term memory (conversations, word finding difficulty) has mostly been affected, some long-term memory impairment. Edita is able to dress herself, bathe herself, needs help at times if she gets dizzy or short of breath (wears O2 PRN). Edita helps to cook meals in the home, but never alone. No safety concerns in the kitchen per her . Edita has not drove since around . Edita is not managing finances but her  keeps her involved in their finances.   Edita states she feels she is sleeping generally well at night time, however her  states she only gets 4-5 hours per night on avg to use the bathroom or difficulty maintaining sleep. Her  states Edita does have some sundowning at night time- asking who her  is, where are they. He states she is redirectable. Typically Edita and her  travel a lot. They had 4 week trip to [place] 1 yr ago. She would forget who her family was during the evening. She was shown pics of them on an ipad and was able to recall who they were. They have not traveled as much this last year as its become more difficult for them. Her  states when Edita is overly tired he notices she gets more forgetful.   Medications: Albuterol PRN Furosemide Latanoprost drops Opsumit Tadalafil Winrevair Zyrtec  Allergies: PCN Verapamil  Eggs Latex  Social: Lives in a home in [facility] [place] Lives with , 2 sons 4 children, 3 living. 1 child  at the age of 2 Highest level of education: 12th grade, 1 yr of college No smoking history, no drinking Wears hearing aids b/l.

## 2025-06-29 NOTE — ASSESSMENT
[FreeTextEntry1] : Please get neuropsychological testing, can try Dr. Gee at [facility] Please get xrays prior to MRI to check for metal EKG in preparation for medication PT referral Labwork RTC in 2-3 months to see Gauri Redman NP  For brain health  - healthy eating/diet for memory cognition with diet rich in fiber, fruits and vegetables and low in saturated fats, processed foods. - good sleep, 7-8 hours a night. No use of phone or watching television before bed. - aerobic exercise, at least 30 minutes, such as a brisk walk 3-4 times a week. - keep mind active with puzzles, reading , socializing with others. - abstaining from excess alcohol, drug use. - blood pressure and cholesterol monitoring , blood glucose monitoring to prevent microvascular disease which can lead to cognitive impairment.

## 2025-06-29 NOTE — PHYSICAL EXAM
[FreeTextEntry1] : Physical examination  General: No acute distress, Awake, Alert.   Mental status  Awake, alert, and oriented to person, time and place, Normal attention span and concentration, Recent and remote memory intact, Language intact, Fund of knowledge intact.  Unable to complete MOCA- got frustrated, inattention  Knows full name, birthday, NOT age (thinks she's around age 30?) NOT month, day or year Knows this is a hospital, NOT name or town [facility] Knows pen, phone, nose  President Teo Word recall 0/3 $1.50 - 6 quarters  Cranial Nerves  II: VFF  III, IV, VI: PERRL, EOMI.  V: Facial sensation is normal B/L.  VII: Facial strength is normal B/L.  VIII: Gross hearing is intact.  IX, X: Palate is midline and elevates symmetrically.  XI: Trapezius normal strength.  XII: Tongue midline without atrophy or fasciculations.   Motor exam  Muscle tone - no evidence of rigidity or resistance in all 4 extremities.  No atrophy or fasciculations  Muscle Strength: arms and legs, proximal and distal flexors and extensors are normal  No UE drift.  Reflexes  All present, normal, and symmetrical.  Plantars right: mute.  Plantars left: mute.   Coordination  Finger to nose: Normal.  Heel to shin: Normal.   Sensory  Intact sensation to vibration and cold.  Gait  Slow, cautious. Walks unassisted

## 2025-06-29 NOTE — ASSESSMENT
[FreeTextEntry1] : Please get neuropsychological testing, can try Dr. Gee at [facility] Please get xrays prior to MRI to check for metal EKG in preparation for medication PT referral Labwork RTC in 2-3 months to see Gauri Redman NP  For brain health  - healthy eating/diet for memory cognition with diet rich in fiber, fruits and vegetables and low in saturated fats, processed foods. - good sleep, 7-8 hours a night. No use of phone or watching television before bed. - aerobic exercise, at least 30 minutes, such as a brisk walk 3-4 times a week. - keep mind active with puzzles, reading , socializing with others. - abstaining from excess alcohol, drug use. - blood pressure and cholesterol monitoring , blood glucose monitoring to prevent microvascular disease which can lead to cognitive impairment.

## 2025-06-29 NOTE — DISCUSSION/SUMMARY
[FreeTextEntry1] : Edita Nicolas is a 74 year old woman with a pertinent medical history of COPD, pulmonary HTN, CHD, valvular heart disease, presenting today for consultation and evaluation of memory complaints. Edita is accompanied by her  at today's visit. Per Edita's , she has been having a steady gradual cognitive decline for the last 5 years. She has had previous neuropsychological testing per her , which they do not have records to provide us with today. He states at that time the results demonstrated signs of a cognitive impairment. They did not pursue further testing at the time as they thought her cognitive decline was a side effect of covid. Edita was unable to complete MoCA during today's exam- she was unable to concentrate fully to complete. She is not oriented to age, time, place. Signs of a moderate cognitive impairment are present during today's exam. Would like to obtain neuropsychological exam for more in-depth memory testing if patient is able to tolerate.  Would like to obtain MRI Brain dementia protocol, will obtain x-rays first to screen for metal prior to MRI. Obtain lab work ordered today. Given patients cardiac history and complaints of intermittent dizziness, would avoid donepezil due to possible adverse SE. Can consider trialing memantine once testing complete and kidney function is wnl.

## 2025-06-29 NOTE — PHYSICAL EXAM
[FreeTextEntry1] : Physical examination  General: No acute distress, Awake, Alert.   Mental status  Awake, alert, and oriented to person, time and place, Normal attention span and concentration, Recent and remote memory intact, Language intact, Fund of knowledge intact.  Unable to complete MOCA- got frustrated, inattention  Knows full name, birthday, NOT age (thinks she's around age 30?) NOT month, day or year Knows this is a hospital, NOT name or town [facility] Know[facility] pen, phone, nose  President Teo Word recall 0/3 $1.50 - 6 quarters  Cranial Nerves  II: VFF  III, IV, VI: PERRL, EOMI.  V: Facial sensation is normal B/L.  VII: Facial strength is normal B/L.  VIII: Gross hearing is intact.  IX, X: Palate is midline and elevates symmetrically.  XI: Trapezius normal strength.  XII: Tongue midline without atrophy or fasciculations.   Motor exam  Muscle tone - no evidence of rigidity or resistance in all 4 extremities.  No atrophy or fasciculations  Muscle Strength: arms and legs, proximal and distal flexors and extensors are normal  No UE drift.  Reflexes  All present, normal, and symmetrical.  Plantars right: mute.  Plantars left: mute.   Coordination  Finger to nose: Normal.  Heel to shin: Normal.   Sensory  Intact sensation to vibration and cold.  Gait  Slow, cautious. Walks unassisted